# Patient Record
Sex: FEMALE | Race: WHITE | NOT HISPANIC OR LATINO | Employment: OTHER | URBAN - METROPOLITAN AREA
[De-identification: names, ages, dates, MRNs, and addresses within clinical notes are randomized per-mention and may not be internally consistent; named-entity substitution may affect disease eponyms.]

---

## 2017-11-17 ENCOUNTER — TRANSCRIBE ORDERS (OUTPATIENT)
Dept: MAMMOGRAPHY | Facility: HOSPITAL | Age: 62
End: 2017-11-17

## 2017-11-17 DIAGNOSIS — Z12.31 VISIT FOR SCREENING MAMMOGRAM: Primary | ICD-10-CM

## 2017-12-13 ENCOUNTER — APPOINTMENT (OUTPATIENT)
Dept: MAMMOGRAPHY | Facility: HOSPITAL | Age: 62
End: 2017-12-13
Attending: OBSTETRICS & GYNECOLOGY

## 2018-01-03 ENCOUNTER — TRANSCRIBE ORDERS (OUTPATIENT)
Dept: MAMMOGRAPHY | Facility: HOSPITAL | Age: 63
End: 2018-01-03

## 2018-01-03 DIAGNOSIS — Z12.31 VISIT FOR SCREENING MAMMOGRAM: Primary | ICD-10-CM

## 2018-01-24 ENCOUNTER — TRANSCRIBE ORDERS (OUTPATIENT)
Dept: MAMMOGRAPHY | Facility: HOSPITAL | Age: 63
End: 2018-01-24

## 2018-01-24 DIAGNOSIS — Z12.31 VISIT FOR SCREENING MAMMOGRAM: Primary | ICD-10-CM

## 2018-02-13 ENCOUNTER — HOSPITAL ENCOUNTER (OUTPATIENT)
Dept: MAMMOGRAPHY | Facility: HOSPITAL | Age: 63
Discharge: HOME | End: 2018-02-13
Attending: OBSTETRICS & GYNECOLOGY | Admitting: OBSTETRICS & GYNECOLOGY

## 2018-02-13 DIAGNOSIS — Z12.31 VISIT FOR SCREENING MAMMOGRAM: ICD-10-CM

## 2018-02-13 PROCEDURE — 77063 BREAST TOMOSYNTHESIS BI: CPT

## 2018-02-13 PROCEDURE — 77063 BREAST TOMOSYNTHESIS BI: CPT | Performed by: RADIOLOGY

## 2018-02-13 PROCEDURE — 77067 SCR MAMMO BI INCL CAD: CPT | Performed by: RADIOLOGY

## 2018-02-13 PROCEDURE — 77067 SCR MAMMO BI INCL CAD: CPT

## 2018-06-06 ENCOUNTER — OUTSIDE FACILITY SERVICE (OUTPATIENT)
Dept: GASTROENTEROLOGY | Facility: CLINIC | Age: 63
End: 2018-06-06

## 2018-06-06 ENCOUNTER — LAB REQUISITION (OUTPATIENT)
Dept: LAB | Facility: HOSPITAL | Age: 63
End: 2018-06-06

## 2018-06-06 DIAGNOSIS — K90.0 CELIAC DISEASE: ICD-10-CM

## 2018-06-06 DIAGNOSIS — R10.13 EPIGASTRIC PAIN: ICD-10-CM

## 2018-06-06 PROCEDURE — 43239 EGD BIOPSY SINGLE/MULTIPLE: CPT | Performed by: INTERNAL MEDICINE

## 2018-06-06 PROCEDURE — 88305 TISSUE EXAM BY PATHOLOGIST: CPT | Performed by: INTERNAL MEDICINE

## 2018-06-07 LAB
CYTO UR: NORMAL
LAB AP CASE REPORT: NORMAL
LAB AP CLINICAL INFORMATION: NORMAL
LAB AP DIAGNOSIS COMMENT: NORMAL
Lab: NORMAL
PATH REPORT.FINAL DX SPEC: NORMAL
PATH REPORT.GROSS SPEC: NORMAL

## 2018-08-23 DIAGNOSIS — Z12.11 SCREENING FOR COLON CANCER: Primary | ICD-10-CM

## 2018-08-30 ENCOUNTER — OUTSIDE FACILITY SERVICE (OUTPATIENT)
Dept: GASTROENTEROLOGY | Facility: CLINIC | Age: 63
End: 2018-08-30

## 2018-08-30 PROCEDURE — G0105 COLORECTAL SCRN; HI RISK IND: HCPCS | Performed by: INTERNAL MEDICINE

## 2019-02-05 ENCOUNTER — TRANSCRIBE ORDERS (OUTPATIENT)
Dept: ADMINISTRATIVE | Facility: HOSPITAL | Age: 64
End: 2019-02-05

## 2019-02-05 DIAGNOSIS — Z12.31 VISIT FOR SCREENING MAMMOGRAM: Primary | ICD-10-CM

## 2019-02-20 ENCOUNTER — HOSPITAL ENCOUNTER (OUTPATIENT)
Dept: MAMMOGRAPHY | Facility: HOSPITAL | Age: 64
Discharge: HOME | End: 2019-02-20
Admitting: OBSTETRICS & GYNECOLOGY

## 2019-02-20 DIAGNOSIS — Z12.31 VISIT FOR SCREENING MAMMOGRAM: ICD-10-CM

## 2019-02-20 PROCEDURE — 77063 BREAST TOMOSYNTHESIS BI: CPT

## 2019-02-20 PROCEDURE — 77067 SCR MAMMO BI INCL CAD: CPT | Performed by: RADIOLOGY

## 2019-02-20 PROCEDURE — 77063 BREAST TOMOSYNTHESIS BI: CPT | Performed by: RADIOLOGY

## 2019-02-20 PROCEDURE — 77067 SCR MAMMO BI INCL CAD: CPT

## 2020-02-06 ENCOUNTER — TRANSCRIBE ORDERS (OUTPATIENT)
Dept: MAMMOGRAPHY | Facility: HOSPITAL | Age: 65
End: 2020-02-06

## 2020-02-06 DIAGNOSIS — Z12.31 VISIT FOR SCREENING MAMMOGRAM: Primary | ICD-10-CM

## 2020-03-02 ENCOUNTER — TRANSCRIBE ORDERS (OUTPATIENT)
Dept: MAMMOGRAPHY | Facility: HOSPITAL | Age: 65
End: 2020-03-02

## 2020-03-02 DIAGNOSIS — Z12.31 VISIT FOR SCREENING MAMMOGRAM: Primary | ICD-10-CM

## 2020-03-20 ENCOUNTER — HOSPITAL ENCOUNTER (OUTPATIENT)
Dept: MAMMOGRAPHY | Facility: HOSPITAL | Age: 65
Discharge: HOME | End: 2020-03-20
Admitting: OBSTETRICS & GYNECOLOGY

## 2020-03-20 DIAGNOSIS — Z12.31 VISIT FOR SCREENING MAMMOGRAM: ICD-10-CM

## 2020-03-20 PROCEDURE — 77063 BREAST TOMOSYNTHESIS BI: CPT | Performed by: RADIOLOGY

## 2020-03-20 PROCEDURE — 77067 SCR MAMMO BI INCL CAD: CPT

## 2020-03-20 PROCEDURE — 77063 BREAST TOMOSYNTHESIS BI: CPT

## 2020-03-20 PROCEDURE — 77067 SCR MAMMO BI INCL CAD: CPT | Performed by: RADIOLOGY

## 2020-08-17 NOTE — PROGRESS NOTES
Encounter Date:08/18/2020    Location: Elaine Lundberg MD    Patient ID: Preeti Guerra is a 65 y.o. female, resident of [place], [place].    1955  Subjective:      Chief Complaint/Reason for visit:  Chest pain    Problem List:  1. Chest pain  A. Echocardiogram 5/31/16, ZURI Sethi MD revealing NL LVEF 55-60%, mild MR, mild AR, trace TR.  B. Normal Carotid Duplex 5/31/16  2. Abnormal EKG 8/18/20  3. Hyperlipidemia- diet controlled  4. GERD  5. Anxiety  6. Celiac Disease    HPI: Mrs. Guerra is a 65 yr old white female who presents in consultation today at the request of Elaine Marcos MD for further evaluation of her chest pain. Mrs. Guerra states that when she walks the track at a good pace she develops substernal chest pain that resolves if she slows her pace in just a few minutes.  It does not radiate and she has no associated symptoms.  A couple weeks ago while sitting in a chair having a pedicure she developed symptoms of neck, chest, teeth and jaw pain that lasted less than 5 minutes.  She just slowed her breathing and her symptoms resolved. She notes she had a pre-syncopal episode a year ago and underwent a brain scan which was normal.  She performs no regular exercise currently, but stays busy. She has never had an issue with her blood pressure.    Social History     Socioeconomic History   • Marital status:      Spouse name: Not on file   • Number of children: Not on file   • Years of education: Not on file   • Highest education level: Not on file   Tobacco Use   • Smoking status: Never Smoker   • Smokeless tobacco: Never Used   Substance and Sexual Activity   • Alcohol use: No   • Drug use: No       family history includes Breast cancer (age of onset: 70) in her maternal grandmother; Hypertension in her mother and sister; No Known Problems in her brother, daughter, father, maternal aunt, paternal aunt, paternal grandmother, and son.     has a past medical history of  "Menopause, Mumps, Phlebitis, and Thyroid disorder.    No Known Allergies      Current Outpatient Medications:   •  aspirin tablet, Take  by mouth daily., Disp: , Rfl:   •  Cholecalciferol (VITAMIN D) 2000 UNITS capsule, Take  by mouth daily., Disp: , Rfl:   •  ELDERBERRY PO, Take  by mouth., Disp: , Rfl:   •  estradiol (ESTRACE) 0.1 MG/GM vaginal cream, Insert 2 g into the vagina daily., Disp: 42.5 g, Rfl: 5  •  ibuprofen (ADVIL,MOTRIN) 800 MG tablet, Take  by mouth., Disp: , Rfl:   •  ketoconazole (NIZORAL) 2 % shampoo, Apply  topically., Disp: , Rfl:   •  Multiple Vitamins-Minerals (PRESERVISION AREDS 2 PO), Take  by mouth., Disp: , Rfl:   •  Omega-3 Fatty Acids (FISH OIL) 1000 MG capsule capsule, Take 1,000 mg by mouth Daily With Breakfast., Disp: , Rfl:   •  pentosan polysulfate (ELMIRON) 100 MG capsule, Take 1 capsule by mouth 2 (two) times a day., Disp: , Rfl:     Review of Systems   Constitution: Negative.   HENT: Positive for nosebleeds.    Eyes:        Glasses   Cardiovascular: Positive for chest pain and near-syncope. Negative for dyspnea on exertion, irregular heartbeat, leg swelling, orthopnea, palpitations, paroxysmal nocturnal dyspnea and syncope.   Respiratory: Negative.    Endocrine: Negative.    Hematologic/Lymphatic: Negative.    Skin: Positive for skin cancer.   Musculoskeletal: Positive for arthritis, back pain and neck pain.   Gastrointestinal:        Celiac disease   Genitourinary: Negative.    Neurological: Positive for light-headedness.   Psychiatric/Behavioral: The patient is nervous/anxious.    Allergic/Immunologic: Negative.      Vitals:    08/18/20 1059   BP: 118/64   BP Location: Left arm   Patient Position: Sitting   Pulse: 76   Temp: 97.7 °F (36.5 °C)   SpO2: 96%   Weight: 75.3 kg (166 lb)   Height: 162.6 cm (64\")     Objective:       Physical Exam   Constitutional: She is oriented to person, place, and time. She appears well-developed and well-nourished.   HENT:   Head: Atraumatic. "   Eyes: Pupils are equal, round, and reactive to light.   Neck: Normal range of motion. No thyromegaly present.   Cardiovascular: Normal rate, regular rhythm, normal heart sounds and intact distal pulses.   Pulses:       Carotid pulses are 2+ on the right side, and 2+ on the left side.       Femoral pulses are 2+ on the right side, and 2+ on the left side.       Dorsalis pedis pulses are 2+ on the right side, and 2+ on the left side.        Posterior tibial pulses are 2+ on the right side, and 2+ on the left side.   Pulmonary/Chest: Effort normal and breath sounds normal. No respiratory distress.   Abdominal: Soft. Bowel sounds are normal. There is tenderness.   Musculoskeletal: Normal range of motion. She exhibits no edema.   Neurological: She is alert and oriented to person, place, and time.   Skin: Skin is warm and dry.   Psychiatric: She has a normal mood and affect. Her behavior is normal. Judgment and thought content normal.   Vitals reviewed.    Data Review:     ECG 12 Lead  Date/Time: 8/18/2020 11:20 AM  Performed by: Fatoumata Jasso MD  Authorized by: Fatoumata Jasso MD   Comparison: compared with previous ECG from 5/9/2016  Similar to previous ECG  Rhythm: sinus rhythm  Rate: normal  BPM: 82  Conduction: conduction normal  QRS axis: normal    Clinical impression: abnormal EKG  Comments: Late Pre-cordial R wave progression             Assessment:      Diagnosis Plan   1. Other chest pain  ECG 12 Lead    Stress Test With Myocardial Perfusion (1 Day)   2. Abnormal EKG  Stress Test With Myocardial Perfusion (1 Day)   3. Dyslipidemia       Plan:   1. Exercise Cardiolite for further evaluation of chest pain and abnormal EKG  2. Follow up in 6 weeks.    Scribed for Fatoumata Jasso MD by Arabella Steele RN. 8/18/2020  11:41    I Fatoumata Jasso MD personally performed the services described in this documentation as scribed by the above individual in my presence, and it is both accurate  and complete.    Fatoumata Jasso MD, [facility]      Please note that portions of this note may have been completed with a voice recognition program. Efforts were made to edit the dictations, but occasionally words are mistranscribed.

## 2020-08-18 ENCOUNTER — CONSULT (OUTPATIENT)
Dept: CARDIOLOGY | Facility: CLINIC | Age: 65
End: 2020-08-18

## 2020-08-18 VITALS
OXYGEN SATURATION: 96 % | DIASTOLIC BLOOD PRESSURE: 64 MMHG | WEIGHT: 166 LBS | HEART RATE: 76 BPM | TEMPERATURE: 97.7 F | BODY MASS INDEX: 28.34 KG/M2 | HEIGHT: 64 IN | SYSTOLIC BLOOD PRESSURE: 118 MMHG

## 2020-08-18 DIAGNOSIS — E78.5 DYSLIPIDEMIA: ICD-10-CM

## 2020-08-18 DIAGNOSIS — R94.31 ABNORMAL EKG: ICD-10-CM

## 2020-08-18 DIAGNOSIS — R07.89 OTHER CHEST PAIN: Primary | ICD-10-CM

## 2020-08-18 PROCEDURE — 93000 ELECTROCARDIOGRAM COMPLETE: CPT | Performed by: INTERNAL MEDICINE

## 2020-08-18 PROCEDURE — 99204 OFFICE O/P NEW MOD 45 MIN: CPT | Performed by: INTERNAL MEDICINE

## 2020-08-18 RX ORDER — CHLORAL HYDRATE 500 MG
1000 CAPSULE ORAL
COMMUNITY
End: 2021-01-04

## 2020-09-21 ENCOUNTER — TRANSCRIBE ORDERS (OUTPATIENT)
Dept: ADMINISTRATIVE | Facility: HOSPITAL | Age: 65
End: 2020-09-21

## 2020-09-21 DIAGNOSIS — Z01.818 OTHER SPECIFIED PRE-OPERATIVE EXAMINATION: Primary | ICD-10-CM

## 2020-09-23 ENCOUNTER — LAB (OUTPATIENT)
Dept: LAB | Facility: HOSPITAL | Age: 65
End: 2020-09-23

## 2020-09-23 DIAGNOSIS — Z01.818 OTHER SPECIFIED PRE-OPERATIVE EXAMINATION: ICD-10-CM

## 2020-09-23 PROCEDURE — U0004 COV-19 TEST NON-CDC HGH THRU: HCPCS

## 2020-09-23 PROCEDURE — C9803 HOPD COVID-19 SPEC COLLECT: HCPCS

## 2020-09-24 LAB — SARS-COV-2 RNA NOSE QL NAA+PROBE: NOT DETECTED

## 2020-09-25 ENCOUNTER — HOSPITAL ENCOUNTER (OUTPATIENT)
Dept: CARDIOLOGY | Facility: HOSPITAL | Age: 65
Discharge: HOME | End: 2020-09-25
Admitting: INTERNAL MEDICINE

## 2020-09-25 VITALS
BODY MASS INDEX: 28 KG/M2 | RESPIRATION RATE: 18 BRPM | DIASTOLIC BLOOD PRESSURE: 78 MMHG | SYSTOLIC BLOOD PRESSURE: 120 MMHG | HEIGHT: 64 IN | HEART RATE: 68 BPM | WEIGHT: 164 LBS

## 2020-09-25 DIAGNOSIS — R07.89 OTHER CHEST PAIN: ICD-10-CM

## 2020-09-25 DIAGNOSIS — R94.31 ABNORMAL EKG: ICD-10-CM

## 2020-09-25 LAB
BH CV STRESS BP STAGE 1: NORMAL
BH CV STRESS BP STAGE 2: NORMAL
BH CV STRESS DURATION MIN STAGE 1: 3
BH CV STRESS DURATION MIN STAGE 2: 2
BH CV STRESS DURATION SEC STAGE 1: 0
BH CV STRESS DURATION SEC STAGE 2: 51
BH CV STRESS GRADE STAGE 1: 10
BH CV STRESS GRADE STAGE 2: 12
BH CV STRESS HR STAGE 1: 131
BH CV STRESS HR STAGE 2: 160
BH CV STRESS METS STAGE 1: 5
BH CV STRESS METS STAGE 2: 7.5
BH CV STRESS O2 STAGE 1: 100
BH CV STRESS O2 STAGE 2: 100
BH CV STRESS PROTOCOL 1: NORMAL
BH CV STRESS RECOVERY BP: NORMAL MMHG
BH CV STRESS RECOVERY HR: 85 BPM
BH CV STRESS SPEED STAGE 1: 1.7
BH CV STRESS SPEED STAGE 2: 2.5
BH CV STRESS STAGE 1: 1
BH CV STRESS STAGE 2: 2
LV EF NUC BP: 68 %
MAXIMAL PREDICTED HEART RATE: 154 BPM
PERCENT MAX PREDICTED HR: 103.9 %
STRESS BASELINE BP: NORMAL MMHG
STRESS BASELINE HR: 65 BPM
STRESS O2 SAT REST: 100 %
STRESS PERCENT HR: 122 %
STRESS POST ESTIMATED WORKLOAD: 7 METS
STRESS POST EXERCISE DUR MIN: 5 MIN
STRESS POST EXERCISE DUR SEC: 21 SEC
STRESS POST O2 SAT PEAK: 98 %
STRESS POST PEAK BP: NORMAL MMHG
STRESS POST PEAK HR: 160 BPM
STRESS TARGET HR: 131 BPM

## 2020-09-25 PROCEDURE — 93017 CV STRESS TEST TRACING ONLY: CPT

## 2020-09-25 PROCEDURE — 93018 CV STRESS TEST I&R ONLY: CPT | Performed by: INTERNAL MEDICINE

## 2020-09-25 PROCEDURE — 78452 HT MUSCLE IMAGE SPECT MULT: CPT | Performed by: INTERNAL MEDICINE

## 2020-09-25 PROCEDURE — A9500 TC99M SESTAMIBI: HCPCS | Performed by: INTERNAL MEDICINE

## 2020-09-25 PROCEDURE — 0 TECHNETIUM SESTAMIBI: Performed by: INTERNAL MEDICINE

## 2020-09-25 PROCEDURE — 78452 HT MUSCLE IMAGE SPECT MULT: CPT

## 2020-09-25 RX ADMIN — TECHNETIUM TC 99M SESTAMIBI 1 DOSE: 1 INJECTION INTRAVENOUS at 11:15

## 2020-10-20 ENCOUNTER — OFFICE VISIT (OUTPATIENT)
Dept: CARDIOLOGY | Facility: CLINIC | Age: 65
End: 2020-10-20

## 2020-10-20 VITALS
HEART RATE: 63 BPM | HEIGHT: 63 IN | TEMPERATURE: 96.8 F | BODY MASS INDEX: 28 KG/M2 | SYSTOLIC BLOOD PRESSURE: 112 MMHG | DIASTOLIC BLOOD PRESSURE: 70 MMHG | WEIGHT: 158 LBS | OXYGEN SATURATION: 98 %

## 2020-10-20 DIAGNOSIS — R94.31 ABNORMAL EKG: ICD-10-CM

## 2020-10-20 DIAGNOSIS — R07.89 OTHER CHEST PAIN: Primary | ICD-10-CM

## 2020-10-20 DIAGNOSIS — R94.39 ABNORMAL NUCLEAR STRESS TEST: ICD-10-CM

## 2020-10-20 PROCEDURE — 99213 OFFICE O/P EST LOW 20 MIN: CPT | Performed by: NURSE PRACTITIONER

## 2020-10-20 RX ORDER — LEVOTHYROXINE SODIUM 0.03 MG/1
25 TABLET ORAL DAILY
COMMUNITY
End: 2022-05-05

## 2020-10-20 NOTE — PROGRESS NOTES
[facility] Cardiology    Patient ID: Preeti Guerra is a 65 y.o. female.  : 1955   Contact: [phone]    Encounter date: 10/20/2020    PCP: Elaine Marcos MD      Chief complaint:   Chief Complaint   Patient presents with   • Palpitations     PROBLEM LIST:  1. Chest pain   A. Echocardiogram 16, ZURI Sethi MD revealing NL LVEF 55-60%, mild MR, mild AR, trace TR.   B. Normal Carotid Duplex 16   C. Stress cardiolite 2020: mildly reduced exercise tolerance, stopped exercise due to fatigue, SOA. area of mild fixed  apical hypoperfusion was seen which was actually worse in the resting images and felt to be a normal variant. The 3D  reconstruction showed normal contractility in all segments. The calculated ejection fraction of 68%. No left ventricular  dilation was seen with stress.  2. Abnormal EKG 20.  3. Hyperlipidemia- diet controlled  4. GERD  5. Anxiety  6. Celiac Disease    No Known Allergies    Current Medications:    Current Outpatient Medications:   •  aspirin tablet, Take 81 mg by mouth Daily., Disp: , Rfl:   •  Cholecalciferol (VITAMIN D) 2000 UNITS capsule, Take 2,000 Units by mouth Daily., Disp: , Rfl:   •  ELDERBERRY PO, Take  by mouth Daily., Disp: , Rfl:   •  estradiol (ESTRACE) 0.1 MG/GM vaginal cream, Insert 2 g into the vagina daily., Disp: 42.5 g, Rfl: 5  •  ibuprofen (ADVIL,MOTRIN) 800 MG tablet, Take 800 mg by mouth Every 6 (Six) Hours As Needed., Disp: , Rfl:   •  ketoconazole (NIZORAL) 2 % shampoo, Apply  topically., Disp: , Rfl:   •  levothyroxine (SYNTHROID, LEVOTHROID) 25 MCG tablet, Take 25 mcg by mouth Daily., Disp: , Rfl:   •  Multiple Vitamins-Minerals (PRESERVISION AREDS 2 PO), Take 1 tablet by mouth Daily., Disp: , Rfl:   •  Omega-3 Fatty Acids (FISH OIL) 1000 MG capsule capsule, Take 1,000 mg by mouth Daily With Breakfast., Disp: , Rfl:   •  pentosan polysulfate (ELMIRON) 100 MG capsule, Take 1 capsule by mouth 2 (two)  "times a day., Disp: , Rfl:     HPI    Preeti Guerra is a 65 y.o. female who presents today for follow up on chest pain, FINNEY. Since last visit, she has only had one episode of chest pressure with exertion. She continues to have FINNEY. Denies STEF, PND, orthopnea. BP controlled. PCP follows lipids which have been normal per her account. She had a exercise cardiolite stress which was indeterminate with a perfusion defect worse at rest than with stress.       The following portions of the patient's history were reviewed and updated as appropriate: allergies, current medications and problem list.    Pertinent positives as listed in the HPI.  All other systems reviewed are negative.       Vitals:    10/20/20 1302   BP: 112/70   BP Location: Left arm   Patient Position: Sitting   Pulse: 63   Temp: 96.8 °F (36 °C)   SpO2: 98%   Weight: 71.7 kg (158 lb)   Height: 160 cm (63\")       Physical Exam:  General: Alert and oriented.  Neck: Jugular venous pressure is within normal limits. Carotids have normal upstrokes without bruits.   Cardiovascular: Heart has a nondisplaced focal PMI. Regular rate and rhythm without murmur, gallop or rub.  Lungs: Clear without rales or wheezes. Equal expansion is noted.   Extremities: Show no edema.  Skin: Warm and dry.  Neurologic: Nonfocal.     Diagnostic Data:  Lab Results   Component Value Date    GLUCOSE 90 05/09/2016    BUN 17 05/09/2016    CREATININE 0.9 05/09/2016    K 4.6 05/09/2016    CO2 29 05/09/2016    CALCIUM 10.0 05/09/2016    ALBUMIN 4.4 05/09/2016    AST 20 05/09/2016    ALT 18 05/09/2016     Lab Results   Component Value Date    GLUCOSE 90 05/09/2016    CALCIUM 10.0 05/09/2016     05/09/2016    K 4.6 05/09/2016    CO2 29 05/09/2016     05/09/2016    BUN 17 05/09/2016    CREATININE 0.9 05/09/2016    ANIONGAP 8 05/09/2016     No results found for: CHOL, CHLPL, TRIG, HDL, LDL, LDLDIRECT  Lab Results   Component Value Date    WBC 4.32 05/09/2016    HGB 14.8 " 05/09/2016    HCT 43.6 05/09/2016    MCV 89.7 05/09/2016     05/09/2016     Lab Results   Component Value Date    TSH 3.141 05/09/2016       Procedures      ASSESSMENT:    ICD-10-CM ICD-9-CM   1. Other chest pain  R07.89 786.59   2. Abnormal EKG  R94.31 794.31   3. Abnormal nuclear stress test  R94.39 794.39     Lab results found above were reviewed with the patient.      PLAN:  1. Discussed stress test findings with patient. Since this is an indeterminate stress and she continues to have complaints of exertional chest pressure and FINNEY, a LHC plus/minus CBI is reasonable. She wants to think about this and give us a call back.   2. Continue ASA  3. Should she have worsening chest pain, she was directed to go to ED for further evaluation.   4. Continue all other current medications.  5. F/up in 6 months, sooner if needed.      Electronically signed by LAUREN Marion, 10/20/20, 1:24 PM EDT.

## 2020-10-21 ENCOUNTER — TELEPHONE (OUTPATIENT)
Dept: CARDIOLOGY | Facility: CLINIC | Age: 65
End: 2020-10-21

## 2020-10-21 DIAGNOSIS — R94.39 ABNORMAL NUCLEAR STRESS TEST: Primary | ICD-10-CM

## 2020-10-22 ENCOUNTER — TELEPHONE (OUTPATIENT)
Dept: CARDIOLOGY | Facility: CLINIC | Age: 65
End: 2020-10-22

## 2020-10-22 NOTE — TELEPHONE ENCOUNTER
No answer and no vm to leave a msg- I tried to call her back to let her know that scheduling will call her to schedule LHC-

## 2020-12-18 ENCOUNTER — APPOINTMENT (OUTPATIENT)
Dept: CARDIOLOGY | Facility: HOSPITAL | Age: 65
End: 2020-12-18

## 2020-12-18 ENCOUNTER — HOSPITAL ENCOUNTER (OUTPATIENT)
Facility: HOSPITAL | Age: 65
Discharge: HOME | End: 2020-12-18
Attending: INTERNAL MEDICINE | Admitting: INTERNAL MEDICINE

## 2020-12-18 VITALS
DIASTOLIC BLOOD PRESSURE: 64 MMHG | HEIGHT: 63 IN | OXYGEN SATURATION: 97 % | RESPIRATION RATE: 18 BRPM | WEIGHT: 160 LBS | SYSTOLIC BLOOD PRESSURE: 110 MMHG | TEMPERATURE: 98.1 F | HEART RATE: 67 BPM | BODY MASS INDEX: 28.35 KG/M2

## 2020-12-18 DIAGNOSIS — I25.118 CORONARY ARTERY DISEASE OF NATIVE ARTERY OF NATIVE HEART WITH STABLE ANGINA PECTORIS (HCC): Primary | ICD-10-CM

## 2020-12-18 DIAGNOSIS — R94.39 ABNORMAL NUCLEAR STRESS TEST: ICD-10-CM

## 2020-12-18 DIAGNOSIS — I25.119 CORONARY ARTERY DISEASE INVOLVING NATIVE CORONARY ARTERY OF NATIVE HEART WITH ANGINA PECTORIS (HCC): Primary | ICD-10-CM

## 2020-12-18 LAB
ACT BLD: 301 SECONDS (ref 82–152)
ANION GAP SERPL CALCULATED.3IONS-SCNC: 8 MMOL/L (ref 5–15)
BH CV ECHO MEAS - AI DEC SLOPE: 171 CM/SEC^2
BH CV ECHO MEAS - AI MAX PG: 40.5 MMHG
BH CV ECHO MEAS - AI MAX VEL: 308 CM/SEC
BH CV ECHO MEAS - AI P1/2T: 527.6 MSEC
BH CV ECHO MEAS - AO MAX PG (FULL): 1.1 MMHG
BH CV ECHO MEAS - AO MAX PG: 4.2 MMHG
BH CV ECHO MEAS - AO MEAN PG (FULL): 1 MMHG
BH CV ECHO MEAS - AO MEAN PG: 3 MMHG
BH CV ECHO MEAS - AO ROOT AREA (BSA CORRECTED): 1.9
BH CV ECHO MEAS - AO ROOT AREA: 9.1 CM^2
BH CV ECHO MEAS - AO ROOT DIAM: 3.4 CM
BH CV ECHO MEAS - AO V2 MAX: 103 CM/SEC
BH CV ECHO MEAS - AO V2 MEAN: 75.2 CM/SEC
BH CV ECHO MEAS - AO V2 VTI: 24.3 CM
BH CV ECHO MEAS - ASC AORTA: 3 CM
BH CV ECHO MEAS - AVA(I,A): 2.6 CM^2
BH CV ECHO MEAS - AVA(I,D): 2.6 CM^2
BH CV ECHO MEAS - AVA(V,A): 2.7 CM^2
BH CV ECHO MEAS - AVA(V,D): 2.7 CM^2
BH CV ECHO MEAS - BSA(HAYCOCK): 1.8 M^2
BH CV ECHO MEAS - BSA(HAYCOCK): 1.8 M^2
BH CV ECHO MEAS - BSA: 1.8 M^2
BH CV ECHO MEAS - BSA: 1.8 M^2
BH CV ECHO MEAS - BZI_BMI: 28.3 KILOGRAMS/M^2
BH CV ECHO MEAS - BZI_BMI: 28.3 KILOGRAMS/M^2
BH CV ECHO MEAS - BZI_METRIC_HEIGHT: 160 CM
BH CV ECHO MEAS - BZI_METRIC_HEIGHT: 160 CM
BH CV ECHO MEAS - BZI_METRIC_WEIGHT: 72.6 KG
BH CV ECHO MEAS - BZI_METRIC_WEIGHT: 72.6 KG
BH CV ECHO MEAS - EDV(CUBED): 108.5 ML
BH CV ECHO MEAS - EDV(MOD-SP2): 69 ML
BH CV ECHO MEAS - EDV(MOD-SP4): 73 ML
BH CV ECHO MEAS - EDV(TEICH): 106 ML
BH CV ECHO MEAS - EF(CUBED): 76.1 %
BH CV ECHO MEAS - EF(MOD-BP): 62 %
BH CV ECHO MEAS - EF(MOD-SP2): 63.8 %
BH CV ECHO MEAS - EF(MOD-SP4): 60.3 %
BH CV ECHO MEAS - EF(TEICH): 68 %
BH CV ECHO MEAS - ESV(CUBED): 25.9 ML
BH CV ECHO MEAS - ESV(MOD-SP2): 25 ML
BH CV ECHO MEAS - ESV(MOD-SP4): 29 ML
BH CV ECHO MEAS - ESV(TEICH): 33.9 ML
BH CV ECHO MEAS - FS: 37.9 %
BH CV ECHO MEAS - IVS/LVPW: 0.9
BH CV ECHO MEAS - IVSD: 0.88 CM
BH CV ECHO MEAS - LA DIMENSION: 2.9 CM
BH CV ECHO MEAS - LA/AO: 0.85
BH CV ECHO MEAS - LAD MAJOR: 3.7 CM
BH CV ECHO MEAS - LAT PEAK E' VEL: 9.8 CM/SEC
BH CV ECHO MEAS - LATERAL E/E' RATIO: 6.9
BH CV ECHO MEAS - LV DIASTOLIC VOL/BSA (35-75): 41.5 ML/M^2
BH CV ECHO MEAS - LV IVRT: 0.11 SEC
BH CV ECHO MEAS - LV MASS(C)D: 152.4 GRAMS
BH CV ECHO MEAS - LV MASS(C)DI: 86.7 GRAMS/M^2
BH CV ECHO MEAS - LV MAX PG: 3.2 MMHG
BH CV ECHO MEAS - LV MEAN PG: 2 MMHG
BH CV ECHO MEAS - LV SYSTOLIC VOL/BSA (12-30): 16.5 ML/M^2
BH CV ECHO MEAS - LV V1 MAX: 89.3 CM/SEC
BH CV ECHO MEAS - LV V1 MEAN: 54.9 CM/SEC
BH CV ECHO MEAS - LV V1 VTI: 20.2 CM
BH CV ECHO MEAS - LVIDD: 4.8 CM
BH CV ECHO MEAS - LVIDS: 3 CM
BH CV ECHO MEAS - LVLD AP2: 7.2 CM
BH CV ECHO MEAS - LVLD AP4: 7.1 CM
BH CV ECHO MEAS - LVLS AP2: 6 CM
BH CV ECHO MEAS - LVLS AP4: 5.8 CM
BH CV ECHO MEAS - LVOT AREA (M): 3.1 CM^2
BH CV ECHO MEAS - LVOT AREA: 3.1 CM^2
BH CV ECHO MEAS - LVOT DIAM: 2 CM
BH CV ECHO MEAS - LVPWD: 0.98 CM
BH CV ECHO MEAS - MED PEAK E' VEL: 6.8 CM/SEC
BH CV ECHO MEAS - MEDIAL E/E' RATIO: 10.1
BH CV ECHO MEAS - MV A MAX VEL: 71.3 CM/SEC
BH CV ECHO MEAS - MV DEC SLOPE: 228 CM/SEC^2
BH CV ECHO MEAS - MV DEC TIME: 0.17 SEC
BH CV ECHO MEAS - MV E MAX VEL: 68.3 CM/SEC
BH CV ECHO MEAS - MV E/A: 0.96
BH CV ECHO MEAS - MV P1/2T MAX VEL: 88 CM/SEC
BH CV ECHO MEAS - MV P1/2T: 113 MSEC
BH CV ECHO MEAS - MVA P1/2T LCG: 2.5 CM^2
BH CV ECHO MEAS - MVA(P1/2T): 1.9 CM^2
BH CV ECHO MEAS - PA ACC SLOPE: 307 CM/SEC^2
BH CV ECHO MEAS - PA ACC TIME: 0.17 SEC
BH CV ECHO MEAS - PA MAX PG: 1.4 MMHG
BH CV ECHO MEAS - PA PR(ACCEL): 4.8 MMHG
BH CV ECHO MEAS - PA V2 MAX: 58.2 CM/SEC
BH CV ECHO MEAS - SI(AO): 125.4 ML/M^2
BH CV ECHO MEAS - SI(CUBED): 47 ML/M^2
BH CV ECHO MEAS - SI(LVOT): 36.1 ML/M^2
BH CV ECHO MEAS - SI(MOD-SP2): 25 ML/M^2
BH CV ECHO MEAS - SI(MOD-SP4): 25 ML/M^2
BH CV ECHO MEAS - SI(TEICH): 41 ML/M^2
BH CV ECHO MEAS - SV(AO): 220.6 ML
BH CV ECHO MEAS - SV(CUBED): 82.6 ML
BH CV ECHO MEAS - SV(LVOT): 63.5 ML
BH CV ECHO MEAS - SV(MOD-SP2): 44 ML
BH CV ECHO MEAS - SV(MOD-SP4): 44 ML
BH CV ECHO MEAS - SV(TEICH): 72.1 ML
BH CV ECHO MEAS - TAPSE (>1.6): 2 CM
BH CV ECHO MEASUREMENTS AVERAGE E/E' RATIO: 8.23
BH CV VAS BP RIGHT ARM: NORMAL MMHG
BH CV XLRA - RV BASE: 2.9 CM
BH CV XLRA - RV LENGTH: 5.8 CM
BH CV XLRA - RV MID: 2.4 CM
BH CV XLRA - TDI S': 10.1 CM/SEC
BH CV XLRA MEAS LEFT CCA RATIO VEL: 85.2 CM/SEC
BH CV XLRA MEAS LEFT DIST CCA EDV: 25.1 CM/SEC
BH CV XLRA MEAS LEFT DIST CCA PSV: 85.2 CM/SEC
BH CV XLRA MEAS LEFT DIST ICA EDV: 22.3 CM/SEC
BH CV XLRA MEAS LEFT DIST ICA PSV: 59.8 CM/SEC
BH CV XLRA MEAS LEFT ICA RATIO VEL: 84.5 CM/SEC
BH CV XLRA MEAS LEFT ICA/CCA RATIO: 0.99
BH CV XLRA MEAS LEFT MID CCA EDV: 25.8 CM/SEC
BH CV XLRA MEAS LEFT MID CCA PSV: 90.8 CM/SEC
BH CV XLRA MEAS LEFT MID ICA EDV: 27.7 CM/SEC
BH CV XLRA MEAS LEFT MID ICA PSV: 80.9 CM/SEC
BH CV XLRA MEAS LEFT PROX CCA EDV: 21 CM/SEC
BH CV XLRA MEAS LEFT PROX CCA PSV: 83.1 CM/SEC
BH CV XLRA MEAS LEFT PROX ECA PSV: 76.8 CM/SEC
BH CV XLRA MEAS LEFT PROX ICA EDV: 23.7 CM/SEC
BH CV XLRA MEAS LEFT PROX ICA PSV: 84.5 CM/SEC
BH CV XLRA MEAS LEFT PROX SCLA PSV: 129 CM/SEC
BH CV XLRA MEAS LEFT VERTEBRAL A EDV: 16.8 CM/SEC
BH CV XLRA MEAS LEFT VERTEBRAL A PSV: 59.4 CM/SEC
BH CV XLRA MEAS RIGHT CCA RATIO VEL: 93 CM/SEC
BH CV XLRA MEAS RIGHT DIST CCA EDV: 26.4 CM/SEC
BH CV XLRA MEAS RIGHT DIST CCA PSV: 93 CM/SEC
BH CV XLRA MEAS RIGHT DIST ICA EDV: 18.3 CM/SEC
BH CV XLRA MEAS RIGHT DIST ICA PSV: 65 CM/SEC
BH CV XLRA MEAS RIGHT ICA RATIO VEL: 90.5 CM/SEC
BH CV XLRA MEAS RIGHT ICA/CCA RATIO: 0.97
BH CV XLRA MEAS RIGHT MID CCA EDV: 24.5 CM/SEC
BH CV XLRA MEAS RIGHT MID CCA PSV: 106 CM/SEC
BH CV XLRA MEAS RIGHT MID ICA EDV: 23.2 CM/SEC
BH CV XLRA MEAS RIGHT MID ICA PSV: 64.8 CM/SEC
BH CV XLRA MEAS RIGHT PROX CCA EDV: 12.7 CM/SEC
BH CV XLRA MEAS RIGHT PROX CCA PSV: 73.7 CM/SEC
BH CV XLRA MEAS RIGHT PROX ECA PSV: 77.9 CM/SEC
BH CV XLRA MEAS RIGHT PROX ICA EDV: 27.7 CM/SEC
BH CV XLRA MEAS RIGHT PROX ICA PSV: 90.5 CM/SEC
BH CV XLRA MEAS RIGHT PROX SCLA PSV: 113 CM/SEC
BH CV XLRA MEAS RIGHT VERTEBRAL A EDV: 20.7 CM/SEC
BH CV XLRA MEAS RIGHT VERTEBRAL A PSV: 77.9 CM/SEC
BUN SERPL-MCNC: 10 MG/DL (ref 8–23)
BUN/CREAT SERPL: 11.5 (ref 7–25)
CALCIUM SPEC-SCNC: 8.7 MG/DL (ref 8.6–10.5)
CHLORIDE SERPL-SCNC: 109 MMOL/L (ref 98–107)
CHOLEST SERPL-MCNC: 227 MG/DL (ref 0–200)
CO2 SERPL-SCNC: 26 MMOL/L (ref 22–29)
CREAT SERPL-MCNC: 0.87 MG/DL (ref 0.57–1)
DEPRECATED RDW RBC AUTO: 47.8 FL (ref 37–54)
ERYTHROCYTE [DISTWIDTH] IN BLOOD BY AUTOMATED COUNT: 14.1 % (ref 12.3–15.4)
GFR SERPL CREATININE-BSD FRML MDRD: 65 ML/MIN/1.73
GLUCOSE SERPL-MCNC: 95 MG/DL (ref 65–99)
HCT VFR BLD AUTO: 42.7 % (ref 34–46.6)
HDLC SERPL-MCNC: 48 MG/DL (ref 40–60)
HGB BLD-MCNC: 13.8 G/DL (ref 12–15.9)
LDLC SERPL CALC-MCNC: 151 MG/DL (ref 0–100)
LDLC/HDLC SERPL: 3.08 {RATIO}
LEFT ATRIUM VOLUME INDEX: 14.2 ML/M^2
LEFT ATRIUM VOLUME: 25 ML
LV EF 2D ECHO EST: 60 %
MCH RBC QN AUTO: 29.9 PG (ref 26.6–33)
MCHC RBC AUTO-ENTMCNC: 32.3 G/DL (ref 31.5–35.7)
MCV RBC AUTO: 92.4 FL (ref 79–97)
PLATELET # BLD AUTO: 192 10*3/MM3 (ref 140–450)
PMV BLD AUTO: 9.9 FL (ref 6–12)
POTASSIUM SERPL-SCNC: 3.7 MMOL/L (ref 3.5–5.2)
RBC # BLD AUTO: 4.62 10*6/MM3 (ref 3.77–5.28)
SODIUM SERPL-SCNC: 143 MMOL/L (ref 136–145)
TRIGL SERPL-MCNC: 156 MG/DL (ref 0–150)
VLDLC SERPL-MCNC: 28 MG/DL (ref 5–40)
WBC # BLD AUTO: 4.15 10*3/MM3 (ref 3.4–10.8)

## 2020-12-18 PROCEDURE — 92978 ENDOLUMINL IVUS OCT C 1ST: CPT | Performed by: INTERNAL MEDICINE

## 2020-12-18 PROCEDURE — 93571 IV DOP VEL&/PRESS C FLO 1ST: CPT | Performed by: INTERNAL MEDICINE

## 2020-12-18 PROCEDURE — 99152 MOD SED SAME PHYS/QHP 5/>YRS: CPT | Performed by: INTERNAL MEDICINE

## 2020-12-18 PROCEDURE — 25010000002 FENTANYL CITRATE (PF) 100 MCG/2ML SOLUTION: Performed by: INTERNAL MEDICINE

## 2020-12-18 PROCEDURE — 85027 COMPLETE CBC AUTOMATED: CPT | Performed by: INTERNAL MEDICINE

## 2020-12-18 PROCEDURE — 93306 TTE W/DOPPLER COMPLETE: CPT

## 2020-12-18 PROCEDURE — 25010000002 MIDAZOLAM PER 1 MG: Performed by: INTERNAL MEDICINE

## 2020-12-18 PROCEDURE — 99205 OFFICE O/P NEW HI 60 MIN: CPT | Performed by: STUDENT IN AN ORGANIZED HEALTH CARE EDUCATION/TRAINING PROGRAM

## 2020-12-18 PROCEDURE — 93880 EXTRACRANIAL BILAT STUDY: CPT

## 2020-12-18 PROCEDURE — C1769 GUIDE WIRE: HCPCS | Performed by: INTERNAL MEDICINE

## 2020-12-18 PROCEDURE — C1894 INTRO/SHEATH, NON-LASER: HCPCS | Performed by: INTERNAL MEDICINE

## 2020-12-18 PROCEDURE — C1887 CATHETER, GUIDING: HCPCS | Performed by: INTERNAL MEDICINE

## 2020-12-18 PROCEDURE — 93572 IV DOP VEL&/PRESS C FLO EA: CPT | Performed by: INTERNAL MEDICINE

## 2020-12-18 PROCEDURE — 93880 EXTRACRANIAL BILAT STUDY: CPT | Performed by: INTERNAL MEDICINE

## 2020-12-18 PROCEDURE — 85347 COAGULATION TIME ACTIVATED: CPT

## 2020-12-18 PROCEDURE — 0 IOPAMIDOL PER 1 ML: Performed by: INTERNAL MEDICINE

## 2020-12-18 PROCEDURE — 93458 L HRT ARTERY/VENTRICLE ANGIO: CPT | Performed by: INTERNAL MEDICINE

## 2020-12-18 PROCEDURE — 93306 TTE W/DOPPLER COMPLETE: CPT | Performed by: INTERNAL MEDICINE

## 2020-12-18 PROCEDURE — 80048 BASIC METABOLIC PNL TOTAL CA: CPT | Performed by: INTERNAL MEDICINE

## 2020-12-18 PROCEDURE — C1753 CATH, INTRAVAS ULTRASOUND: HCPCS | Performed by: INTERNAL MEDICINE

## 2020-12-18 PROCEDURE — 99153 MOD SED SAME PHYS/QHP EA: CPT | Performed by: INTERNAL MEDICINE

## 2020-12-18 PROCEDURE — 25010000002 HEPARIN (PORCINE) PER 1000 UNITS: Performed by: INTERNAL MEDICINE

## 2020-12-18 PROCEDURE — 80061 LIPID PANEL: CPT | Performed by: INTERNAL MEDICINE

## 2020-12-18 RX ORDER — HYDROCODONE BITARTRATE AND ACETAMINOPHEN 7.5; 325 MG/1; MG/1
1 TABLET ORAL EVERY 4 HOURS PRN
Status: DISCONTINUED | OUTPATIENT
Start: 2020-12-18 | End: 2020-12-18 | Stop reason: HOSPADM

## 2020-12-18 RX ORDER — HEPARIN SODIUM 1000 [USP'U]/ML
INJECTION, SOLUTION INTRAVENOUS; SUBCUTANEOUS AS NEEDED
Status: DISCONTINUED | OUTPATIENT
Start: 2020-12-18 | End: 2020-12-18 | Stop reason: HOSPADM

## 2020-12-18 RX ORDER — LIDOCAINE HYDROCHLORIDE 10 MG/ML
INJECTION, SOLUTION EPIDURAL; INFILTRATION; INTRACAUDAL; PERINEURAL AS NEEDED
Status: DISCONTINUED | OUTPATIENT
Start: 2020-12-18 | End: 2020-12-18 | Stop reason: HOSPADM

## 2020-12-18 RX ORDER — ALPRAZOLAM 0.25 MG/1
0.25 TABLET ORAL 3 TIMES DAILY PRN
Status: DISCONTINUED | OUTPATIENT
Start: 2020-12-18 | End: 2020-12-18 | Stop reason: HOSPADM

## 2020-12-18 RX ORDER — NALOXONE HCL 0.4 MG/ML
0.4 VIAL (ML) INJECTION
Status: DISCONTINUED | OUTPATIENT
Start: 2020-12-18 | End: 2020-12-18 | Stop reason: HOSPADM

## 2020-12-18 RX ORDER — ROSUVASTATIN CALCIUM 10 MG/1
10 TABLET, COATED ORAL DAILY
Qty: 90 TABLET | Refills: 3 | Status: SHIPPED | OUTPATIENT
Start: 2020-12-18 | End: 2021-08-17 | Stop reason: DRUGHIGH

## 2020-12-18 RX ORDER — MIDAZOLAM HYDROCHLORIDE 1 MG/ML
INJECTION INTRAMUSCULAR; INTRAVENOUS AS NEEDED
Status: DISCONTINUED | OUTPATIENT
Start: 2020-12-18 | End: 2020-12-18 | Stop reason: HOSPADM

## 2020-12-18 RX ORDER — MORPHINE SULFATE 2 MG/ML
1 INJECTION, SOLUTION INTRAMUSCULAR; INTRAVENOUS EVERY 4 HOURS PRN
Status: DISCONTINUED | OUTPATIENT
Start: 2020-12-18 | End: 2020-12-18 | Stop reason: HOSPADM

## 2020-12-18 RX ORDER — ACETAMINOPHEN 325 MG/1
650 TABLET ORAL EVERY 4 HOURS PRN
Status: DISCONTINUED | OUTPATIENT
Start: 2020-12-18 | End: 2020-12-18 | Stop reason: HOSPADM

## 2020-12-18 RX ORDER — FENTANYL CITRATE 50 UG/ML
INJECTION, SOLUTION INTRAMUSCULAR; INTRAVENOUS AS NEEDED
Status: DISCONTINUED | OUTPATIENT
Start: 2020-12-18 | End: 2020-12-18 | Stop reason: HOSPADM

## 2020-12-18 RX ORDER — SODIUM CHLORIDE 9 MG/ML
100 INJECTION, SOLUTION INTRAVENOUS CONTINUOUS
Status: ACTIVE | OUTPATIENT
Start: 2020-12-18 | End: 2020-12-18

## 2020-12-18 RX ADMIN — ACETAMINOPHEN 650 MG: 325 TABLET, FILM COATED ORAL at 10:22

## 2020-12-18 NOTE — CONSULTS
CTS Consult  Preeti Parrahear  [id number]  1955    Patient Care Team:  Elaine Marcos MD as PCP - General  Elaine Marcos MD as PCP - Family Medicine    CC: Left main coronary artery disease      Reason for Consult: 60% left main, IFR positive    HPI: Delightful 65-year-old female who was undergoing a pedicure and developed chest pain that radiated into her jaw.  This lasted about 4 minutes and went away on its own.  She has noticed some decreased stamina and dyspnea on exertion.  This prompted her to seek evaluation by her cardiologist.  She was seen by Dr. Jasso in September with a stress test which was positive.  She underwent left heart catheterization today, December 18, and was found to have a 60% left main with a preserved ejection fraction.  She is otherwise recovering well from the heart catheterization and has no current chest pain.  She did have a history of syncope in 2016 where she was worked up with an echo and carotid duplexes.  She has not had syncope since then.  She does have a history of Crohn's disease as well as hemorrhagic cystitis.      Abnormal nuclear stress test      Review of Systems:  General: No anorexia, no weight loss, general malaise and weakness.  No fever chills or night sweats.  HEENT: No headaches no visual changes no hearing loss no rhinitis no pharyngitis  Pulmonary: No shortness of breath, no cough, no hemoptysis  Heart: No palpitations,  No malaise or shortness of breath, no atrial fibrillation, no bradycardia, no syncope. +anginal quality chest pain.  Gastrointestinal: No nausea, vomiting, diarrhea, or constipation. No acholic stool, no jaundice.  Renal: No dysuria, no frequency, no hematuria.  Skin: No rash, no skin lesions, no skin tumors.  Neurologic: No seizures, no muscle weakness, no sensory deficit, no amaurosis,  Psychiatric: No anxiety, no history of psychosis  Hematologic: No bleeding history, no ease of bruising, no history of blood  disorder.  All other systems were reviewed and are negative.    History  Past Medical History:   Diagnosis Date   • Celiac disease    • Menopause    • Mumps    • Phlebitis    • Thyroid disorder      Past Surgical History:   Procedure Laterality Date   • AUGMENTATION MAMMAPLASTY Bilateral     PT had implants removed   • BREAST SURGERY      implants removed   • SKIN CANCER EXCISION       Family History   Problem Relation Age of Onset   • Hypertension Mother    • Hypertension Sister    • Breast cancer Maternal Grandmother 70   • No Known Problems Father    • No Known Problems Brother    • No Known Problems Daughter    • No Known Problems Son    • No Known Problems Paternal Grandmother    • No Known Problems Maternal Aunt    • No Known Problems Paternal Aunt    • Lung cancer Neg Hx    • Colon cancer Neg Hx    • BRCA 1/2 Neg Hx    • Endometrial cancer Neg Hx    • Ovarian cancer Neg Hx      Social History     Tobacco Use   • Smoking status: Never Smoker   • Smokeless tobacco: Never Used   Substance Use Topics   • Alcohol use: No   • Drug use: No     Medications Prior to Admission   Medication Sig Dispense Refill Last Dose   • aspirin tablet Take 81 mg by mouth Daily.   12/18/2020 at Unknown time   • Cholecalciferol (VITAMIN D) 2000 UNITS capsule Take 2,000 Units by mouth Daily.   Past Week at Unknown time   • ELDERBERRY PO Take  by mouth Daily.   12/17/2020 at Unknown time   • estradiol (ESTRACE) 0.1 MG/GM vaginal cream Insert 2 g into the vagina daily. (Patient taking differently: Insert 2 g into the vagina 1 (One) Time Per Week.) 42.5 g 5 Past Week at Unknown time   • ibuprofen (ADVIL,MOTRIN) 800 MG tablet Take 800 mg by mouth Every 6 (Six) Hours As Needed.      • ketoconazole (NIZORAL) 2 % shampoo Apply  topically.      • levothyroxine (SYNTHROID, LEVOTHROID) 25 MCG tablet Take 25 mcg by mouth Daily.   12/18/2020 at Unknown time   • Omega-3 Fatty Acids (FISH OIL) 1000 MG capsule capsule Take 1,000 mg by mouth Daily  With Breakfast.   Past Month at Unknown time   • pentosan polysulfate (ELMIRON) 100 MG capsule Take 1 capsule by mouth 2 (two) times a day.   12/18/2020 at Unknown time     Allergies:  Patient has no known allergies.    Objective    Vital Signs  Temp:  [98.1 °F (36.7 °C)] 98.1 °F (36.7 °C)  Heart Rate:  [66-89] 70  Resp:  [16-18] 18  BP: ()/(60-82) 110/63    Physical Exam:  General Appearance: alert, appears stated age and cooperative  Head: normocephalic, without obvious abnormality and atraumatic  Ears/Nose: no rhinitis, external ears normal  Throat:  no oral lesions, no pharyngitis  Neck: no adenopathy, suppple, trachea midline, no thyromegaly, no carotid bruit and no JVD  Lungs: clear to auscultation, respirations regular, respirations even and respirations unlabored  Heart: regular rhythm & normal rate, normal S1, S2, no murmur  Abdomen: normal bowel sounds, no masses, soft, non-tender  Extremities: moves extremities well and no edema  Pulses: pulses palpable and equal bilaterally (femoral, DP, PT)  Skin: no bleeding, bruising or rash  Neurologic: mental status orientated to person, place, time and situation, CN intact, no motor or sensory loss          Data Review:  Results from last 7 days   Lab Units 12/18/20  0718   WBC 10*3/mm3 4.15   HEMOGLOBIN g/dL 13.8   HEMATOCRIT % 42.7   PLATELETS 10*3/mm3 192     Results from last 7 days   Lab Units 12/18/20  0718   SODIUM mmol/L 143   POTASSIUM mmol/L 3.7   CHLORIDE mmol/L 109*   CO2 mmol/L 26.0   BUN mg/dL 10   CREATININE mg/dL 0.87   GLUCOSE mg/dL 95   CALCIUM mg/dL 8.7     Coagulation: No results found for: INR, APTT  Cardiac markers:     ABGs:       Invalid input(s): PO2      Imaging Results (Last 72 Hours)     ** No results found for the last 72 hours. **            Imaging:  Left heart catheterization from 12/18/2020 demonstrating a 60% left main lesion and no other significant coronary artery disease      Assessment:      Abnormal nuclear stress  test    Left main coronary artery disease  Hyperlipidemia  History of syncope    Plan:  1.  Left main coronary artery disease-she has an indication for coronary artery bypass grafting.  I have discussed with her the indications for surgery as well as the risks, benefits and alternatives.  She agrees as does Dr. Jasso that surgery is beneficial for this left main stenosis.  As she is chest pain-free, it is safe for her to be discharged and return for surgery.  We have tentatively scheduled her for January 5.    In the interim, as she lives in [facility], we will arrange for a transthoracic echo and carotid artery duplex prior to her discharge this afternoon.    Needs aspirin, statin, beta-blocker    2.  Syncope-her syncopal work-up in 2016 was negative, will repeat imaging as it is nearly 5 years old at this point.    3.  Hyperlipidemia-her total cholesterol is 227 with an LDL of 150, she will need a statin.    4.  Hemorrhagic cystitis-she is well controlled and takes Elmiron.  We will ask her to hold that 5 days before surgery    Risk of Mortality:  0.554%  Renal Failure:  0.351%  Permanent Stroke:  0.679%  Prolonged Ventilation:  2.087%  DSW Infection:  0.072%  Reoperation:  1.033%  Morbidity or Mortality:  3.731%  Short Length of Stay:  68.662%  Long Length of Stay:  1.044%      The ROS, past medical history, surgical history, family history, social history and vitals were reviewed by myself and corrected as needed.      I have reviewed, verified, and confirmed the above history and current status.  I have examined the patient and confirmed the above physical findings.Above plan and treatment regimen discussed in detail with patient.  Options of treatment, attendant risks vs benefits, and my recommendations were discussed and all questions answered.    Would like to thank Dr. Jasso for this consult.    Ion Moody MD  12/18/20  13:43 EST

## 2020-12-18 NOTE — H&P
[facility] Cardiology    Subjective:     Encounter Date:11/25/2020     Patient ID: Preeti Guerra is a 65 y.o. female.  Referring provider: Fatoumata Jasso, *     Reason for consultation: Abnormal stress test     PROBLEM LIST:  1. Chest pain              A. Echocardiogram 5/31/16, ZURI Sethi MD revealing NL LVEF 55-60%, mild MR, mild AR, trace TR.              B. Normal Carotid Duplex 5/31/16              C. Stress cardiolite 8/2020: mildly reduced exercise tolerance, stopped exercise due to fatigue, SOA. area of mild  fixed apical hypoperfusion was seen which was actually worse in the resting images and felt to be a normal variant.  The 3D reconstruction showed normal contractility in all segments. The calculated ejection fraction of 68%. No left  ventricular  dilation was seen with stress.  2. Abnormal EKG 8/18/20.  3. Hyperlipidemia- diet controlled.  4. GERD  5. Anxiety  6. Celiac Disease    HPI:      Preeti Guerra is a 65 y.o. female who presents for C plus/minus CBI. She had the above noted stress test which was indeterminate. She was seen in August at which time she continued to have FINNEY and chest pain. She was offered a stress test at that time but decided to think about it. She called back in October and decided to proceed with [facility].     No Known Allergies    No current facility-administered medications for this encounter.   Social History:  Social History     Tobacco Use   • Smoking status: Never Smoker   • Smokeless tobacco: Never Used   Substance Use Topics   • Alcohol use: No        Family History:  family history includes Breast cancer (age of onset: 70) in her maternal grandmother; Hypertension in her mother and sister; No Known Problems in her brother, daughter, father, maternal aunt, paternal aunt, paternal grandmother, and son.     Review of Systems: The following portions of the patient's history were reviewed and updated as appropriate: allergies,  current medications and problem list.    Constitution: Negative for chills, fatigue, fever, and generalized weakness.   Cardiovascular: See HPI  Respiratory: See HPI  HENT: Negative for ear pain, nosebleeds, and tinnitus.  Gastrointestinal: Negative for abdominal pain, constipation, diarrhea, nausea and vomiting.   Genitourinary: No urinary symptoms.   Musculoskeletal: Negative for muscle cramps.  Neurological: Negative for dizziness, headaches, loss of balance, numbness, and symptoms of stroke.  Psychiatric: Negative for depression, anxiety.           Objective:     Vitals:    12/18/20 0720   BP: 109/67   Pulse: 77   Temp: 98.1 °F (36.7 °C)   SpO2: 97%       GENERAL: This is a well-developed, well-nourished, female who is in no acute distress.   SKIN: Pink and warm without rash or abnormality noted.   HEENT: Head is normocephalic and atraumatic. Pupils are equal and reactive to light bilaterally. Mucous membranes are pink and moist.   NECK: Supple without lymphadenopathy or thyromegaly. There is no jugular venous distention at 30°.  LUNGS: Clear to auscultation bilaterally without wheezing, rhonchi, or rales noted.   CARDIOVASCULAR: The heart has a regular rate with a normal S1 and S2. There is no murmur, gallop, rub, or click appreciated. The PMI is nondisplaced. Carotid upstrokes are 2+ and symmetrical without bruits.   ABDOMEN: Soft and nondistended with positive bowel sounds x4. The patient denies tenderness of palpitation.   MUSCULOSKELETAL: There are no obvious bony abnormalities. Normal range of tenderness to palpation.   NEUROLOGICAL: Nonfocal. Alert and oriented x3.   PERIPHERAL VASCULAR: Femoral pulses are 2+ and symmetrical without bruits. Posterior tibial and dorsalis pedis pulses are 2+ and symmetrical. There is no peripheral edema.   PSYCH: Normal mood and affect.         ASSESSMENT       ICD-10-CM ICD-9-CM   1. Abnormal nuclear stress test  R94.39 794.39          PLAN     Patient presents for [facility]  plus/minus CBI using RRA. The risks, benefits, and alternatives of the procedure have been reviewed and the patient wishes to proceed.       Electronically signed by LAUREN Marion, 12/18/20, 7:35 AM AXEL.    Fatoumata Jasso MD, [facility]

## 2020-12-22 PROBLEM — I25.119 CORONARY ARTERY DISEASE INVOLVING NATIVE CORONARY ARTERY OF NATIVE HEART WITH ANGINA PECTORIS: Status: ACTIVE | Noted: 2020-12-22

## 2020-12-23 ENCOUNTER — PREP FOR SURGERY (OUTPATIENT)
Dept: OTHER | Facility: HOSPITAL | Age: 65
End: 2020-12-23

## 2020-12-23 DIAGNOSIS — I25.119 CORONARY ARTERY DISEASE INVOLVING NATIVE CORONARY ARTERY OF NATIVE HEART WITH ANGINA PECTORIS (HCC): Primary | ICD-10-CM

## 2020-12-23 RX ORDER — ACETAMINOPHEN 325 MG/1
650 TABLET ORAL EVERY 4 HOURS PRN
Status: CANCELLED | OUTPATIENT
Start: 2021-01-05

## 2020-12-23 RX ORDER — CHLORHEXIDINE GLUCONATE 500 MG/1
1 CLOTH TOPICAL EVERY 12 HOURS PRN
Status: CANCELLED | OUTPATIENT
Start: 2021-01-05

## 2020-12-23 RX ORDER — CHLORHEXIDINE GLUCONATE 500 MG/1
1 CLOTH TOPICAL EVERY 12 HOURS PRN
Status: CANCELLED | OUTPATIENT
Start: 2021-01-04

## 2020-12-23 RX ORDER — ASPIRIN 325 MG
325 TABLET ORAL NIGHTLY
Status: CANCELLED | OUTPATIENT
Start: 2021-01-04 | End: 2021-01-05

## 2020-12-23 RX ORDER — CHLORHEXIDINE GLUCONATE 0.12 MG/ML
15 RINSE ORAL ONCE
Status: CANCELLED | OUTPATIENT
Start: 2021-01-05 | End: 2021-01-05

## 2020-12-23 RX ORDER — NITROGLYCERIN 0.4 MG/1
0.4 TABLET SUBLINGUAL
Status: CANCELLED | OUTPATIENT
Start: 2021-01-05

## 2021-01-04 ENCOUNTER — HOSPITAL ENCOUNTER (OUTPATIENT)
Dept: PULMONOLOGY | Facility: HOSPITAL | Age: 66
Discharge: HOME | End: 2021-01-04

## 2021-01-04 ENCOUNTER — APPOINTMENT (OUTPATIENT)
Dept: PREADMISSION TESTING | Facility: HOSPITAL | Age: 66
End: 2021-01-04

## 2021-01-04 ENCOUNTER — HOSPITAL ENCOUNTER (OUTPATIENT)
Dept: GENERAL RADIOLOGY | Facility: HOSPITAL | Age: 66
Discharge: HOME | End: 2021-01-04

## 2021-01-04 ENCOUNTER — ANESTHESIA EVENT (OUTPATIENT)
Dept: PERIOP | Facility: HOSPITAL | Age: 66
End: 2021-01-04

## 2021-01-04 VITALS — WEIGHT: 156.8 LBS | BODY MASS INDEX: 26.77 KG/M2 | HEIGHT: 64 IN

## 2021-01-04 DIAGNOSIS — I25.119 CORONARY ARTERY DISEASE INVOLVING NATIVE CORONARY ARTERY OF NATIVE HEART WITH ANGINA PECTORIS (HCC): ICD-10-CM

## 2021-01-04 LAB
ABO GROUP BLD: NORMAL
ALBUMIN SERPL-MCNC: 4.6 G/DL (ref 3.5–5.2)
ALBUMIN/GLOB SERPL: 1.3 G/DL
ALP SERPL-CCNC: 106 U/L (ref 39–117)
ALT SERPL W P-5'-P-CCNC: 16 U/L (ref 1–33)
AMPHET+METHAMPHET UR QL: NEGATIVE
AMPHETAMINES UR QL: NEGATIVE
ANION GAP SERPL CALCULATED.3IONS-SCNC: 10 MMOL/L (ref 5–15)
APTT PPP: 37 SECONDS (ref 24–37)
AST SERPL-CCNC: 20 U/L (ref 1–32)
BARBITURATES UR QL SCN: NEGATIVE
BASOPHILS # BLD AUTO: 0.07 10*3/MM3 (ref 0–0.2)
BASOPHILS NFR BLD AUTO: 1.1 % (ref 0–1.5)
BENZODIAZ UR QL SCN: NEGATIVE
BILIRUB SERPL-MCNC: 0.5 MG/DL (ref 0–1.2)
BLD GP AB SCN SERPL QL: NEGATIVE
BUN SERPL-MCNC: 22 MG/DL (ref 8–23)
BUN/CREAT SERPL: 25.9 (ref 7–25)
BUPRENORPHINE SERPL-MCNC: NEGATIVE NG/ML
CALCIUM SPEC-SCNC: 9.9 MG/DL (ref 8.6–10.5)
CANNABINOIDS SERPL QL: NEGATIVE
CHLORIDE SERPL-SCNC: 100 MMOL/L (ref 98–107)
CO2 SERPL-SCNC: 29 MMOL/L (ref 22–29)
COCAINE UR QL: NEGATIVE
CREAT SERPL-MCNC: 0.85 MG/DL (ref 0.57–1)
DEPRECATED RDW RBC AUTO: 46.7 FL (ref 37–54)
EOSINOPHIL # BLD AUTO: 0.12 10*3/MM3 (ref 0–0.4)
EOSINOPHIL NFR BLD AUTO: 1.9 % (ref 0.3–6.2)
ERYTHROCYTE [DISTWIDTH] IN BLOOD BY AUTOMATED COUNT: 13.7 % (ref 12.3–15.4)
GFR SERPL CREATININE-BSD FRML MDRD: 67 ML/MIN/1.73
GLOBULIN UR ELPH-MCNC: 3.6 GM/DL
GLUCOSE SERPL-MCNC: 115 MG/DL (ref 65–99)
HBA1C MFR BLD: 5.3 % (ref 4.8–5.6)
HCT VFR BLD AUTO: 44.2 % (ref 34–46.6)
HGB BLD-MCNC: 14.3 G/DL (ref 12–15.9)
IMM GRANULOCYTES # BLD AUTO: 0.02 10*3/MM3 (ref 0–0.05)
IMM GRANULOCYTES NFR BLD AUTO: 0.3 % (ref 0–0.5)
INR PPP: 0.98 (ref 0.85–1.16)
LYMPHOCYTES # BLD AUTO: 1.27 10*3/MM3 (ref 0.7–3.1)
LYMPHOCYTES NFR BLD AUTO: 19.8 % (ref 19.6–45.3)
MAGNESIUM SERPL-MCNC: 3.3 MG/DL (ref 1.6–2.4)
MCH RBC QN AUTO: 29.7 PG (ref 26.6–33)
MCHC RBC AUTO-ENTMCNC: 32.4 G/DL (ref 31.5–35.7)
MCV RBC AUTO: 91.9 FL (ref 79–97)
METHADONE UR QL SCN: NEGATIVE
MONOCYTES # BLD AUTO: 0.38 10*3/MM3 (ref 0.1–0.9)
MONOCYTES NFR BLD AUTO: 5.9 % (ref 5–12)
NEUTROPHILS NFR BLD AUTO: 4.57 10*3/MM3 (ref 1.7–7)
NEUTROPHILS NFR BLD AUTO: 71 % (ref 42.7–76)
NRBC BLD AUTO-RTO: 0 /100 WBC (ref 0–0.2)
OPIATES UR QL: NEGATIVE
OXYCODONE UR QL SCN: NEGATIVE
PA ADP PRP-ACNC: 184 PRU
PCP UR QL SCN: NEGATIVE
PLATELET # BLD AUTO: 265 10*3/MM3 (ref 140–450)
PMV BLD AUTO: 10.1 FL (ref 6–12)
POTASSIUM SERPL-SCNC: 3.6 MMOL/L (ref 3.5–5.2)
PROPOXYPH UR QL: NEGATIVE
PROT SERPL-MCNC: 8.2 G/DL (ref 6–8.5)
PROTHROMBIN TIME: 12.7 SECONDS (ref 11.5–14)
RBC # BLD AUTO: 4.81 10*6/MM3 (ref 3.77–5.28)
RH BLD: POSITIVE
SODIUM SERPL-SCNC: 139 MMOL/L (ref 136–145)
T&S EXPIRATION DATE: NORMAL
TRICYCLICS UR QL SCN: NEGATIVE
WBC # BLD AUTO: 6.43 10*3/MM3 (ref 3.4–10.8)

## 2021-01-04 PROCEDURE — 86920 COMPATIBILITY TEST SPIN: CPT

## 2021-01-04 PROCEDURE — 85610 PROTHROMBIN TIME: CPT

## 2021-01-04 PROCEDURE — 86901 BLOOD TYPING SEROLOGIC RH(D): CPT

## 2021-01-04 PROCEDURE — 85730 THROMBOPLASTIN TIME PARTIAL: CPT

## 2021-01-04 PROCEDURE — 85025 COMPLETE CBC W/AUTO DIFF WBC: CPT

## 2021-01-04 PROCEDURE — 86900 BLOOD TYPING SEROLOGIC ABO: CPT

## 2021-01-04 PROCEDURE — 36415 COLL VENOUS BLD VENIPUNCTURE: CPT

## 2021-01-04 PROCEDURE — 83735 ASSAY OF MAGNESIUM: CPT

## 2021-01-04 PROCEDURE — 86850 RBC ANTIBODY SCREEN: CPT

## 2021-01-04 PROCEDURE — 94010 BREATHING CAPACITY TEST: CPT

## 2021-01-04 PROCEDURE — 71046 X-RAY EXAM CHEST 2 VIEWS: CPT

## 2021-01-04 PROCEDURE — 94010 BREATHING CAPACITY TEST: CPT | Performed by: INTERNAL MEDICINE

## 2021-01-04 PROCEDURE — 80053 COMPREHEN METABOLIC PANEL: CPT

## 2021-01-04 PROCEDURE — 83036 HEMOGLOBIN GLYCOSYLATED A1C: CPT

## 2021-01-04 PROCEDURE — 85576 BLOOD PLATELET AGGREGATION: CPT

## 2021-01-04 PROCEDURE — 80306 DRUG TEST PRSMV INSTRMNT: CPT

## 2021-01-04 RX ORDER — HYDROCHLOROTHIAZIDE 25 MG/1
12.5 TABLET ORAL DAILY
COMMUNITY

## 2021-01-04 RX ORDER — VIT C/HESPERIDIN/BIOFLAVONOIDS 500-100 MG
TABLET ORAL DAILY
COMMUNITY
End: 2021-07-28

## 2021-01-04 RX ORDER — UBIDECARENONE 100 MG
100 CAPSULE ORAL DAILY
COMMUNITY
End: 2021-07-28

## 2021-01-04 NOTE — PAT
Patient to apply Chlorhexadine wipes  to surgical area (as instructed) the night before procedure and the AM of procedure. Wipes provided.    Instructed patient to take 325 mg the night before heart surgery as per CT surgeon's order.  Patient and/or family verbalized understanding.    Patient was given Bactroban to use the night before surgery.    Room air sat 97%.

## 2021-01-05 ENCOUNTER — ANCILLARY PROCEDURE (OUTPATIENT)
Dept: PERIOP | Facility: HOSPITAL | Age: 66
End: 2021-01-05

## 2021-01-05 ENCOUNTER — ANESTHESIA (OUTPATIENT)
Dept: PERIOP | Facility: HOSPITAL | Age: 66
End: 2021-01-05

## 2021-01-05 ENCOUNTER — APPOINTMENT (OUTPATIENT)
Dept: GENERAL RADIOLOGY | Facility: HOSPITAL | Age: 66
End: 2021-01-05

## 2021-01-05 ENCOUNTER — HOSPITAL ENCOUNTER (INPATIENT)
Facility: HOSPITAL | Age: 66
LOS: 4 days | Discharge: HOME | End: 2021-01-09
Attending: STUDENT IN AN ORGANIZED HEALTH CARE EDUCATION/TRAINING PROGRAM | Admitting: STUDENT IN AN ORGANIZED HEALTH CARE EDUCATION/TRAINING PROGRAM

## 2021-01-05 DIAGNOSIS — I25.119 CORONARY ARTERY DISEASE INVOLVING NATIVE CORONARY ARTERY OF NATIVE HEART WITH ANGINA PECTORIS (HCC): Primary | ICD-10-CM

## 2021-01-05 LAB
ACT BLD: 147 SECONDS (ref 82–152)
ACT BLD: 164 SECONDS (ref 82–152)
ACT BLD: 527 SECONDS (ref 82–152)
ACT BLD: 670 SECONDS (ref 82–152)
ACT BLD: 714 SECONDS (ref 82–152)
ALBUMIN SERPL-MCNC: 4.1 G/DL (ref 3.5–5.2)
ALBUMIN SERPL-MCNC: 4.7 G/DL (ref 3.5–5.2)
ANION GAP SERPL CALCULATED.3IONS-SCNC: 10 MMOL/L (ref 5–15)
ANION GAP SERPL CALCULATED.3IONS-SCNC: 12 MMOL/L (ref 5–15)
APTT PPP: 43.5 SECONDS (ref 24–37)
APTT PPP: 44.8 SECONDS (ref 24–37)
ARTERIAL PATENCY WRIST A: ABNORMAL
ARTERIAL PATENCY WRIST A: ABNORMAL
ATMOSPHERIC PRESS: ABNORMAL MM[HG]
ATMOSPHERIC PRESS: ABNORMAL MM[HG]
BASE EXCESS BLDA CALC-SCNC: 0.7 MMOL/L (ref 0–2)
BASE EXCESS BLDA CALC-SCNC: 1 MMOL/L (ref 0–2)
BASOPHILS # BLD AUTO: 0.05 10*3/MM3 (ref 0–0.2)
BASOPHILS NFR BLD AUTO: 0.5 % (ref 0–1.5)
BDY SITE: ABNORMAL
BDY SITE: ABNORMAL
BODY TEMPERATURE: 37 C
BODY TEMPERATURE: 37 C
BUN SERPL-MCNC: 12 MG/DL (ref 8–23)
BUN SERPL-MCNC: 12 MG/DL (ref 8–23)
BUN/CREAT SERPL: 14.5 (ref 7–25)
BUN/CREAT SERPL: 15.4 (ref 7–25)
CA-I SERPL ISE-MCNC: 1.5 MMOL/L (ref 1.12–1.32)
CALCIUM SPEC-SCNC: 10.2 MG/DL (ref 8.6–10.5)
CALCIUM SPEC-SCNC: 9.4 MG/DL (ref 8.6–10.5)
CHLORIDE SERPL-SCNC: 105 MMOL/L (ref 98–107)
CHLORIDE SERPL-SCNC: 105 MMOL/L (ref 98–107)
CO2 BLDA-SCNC: 26.3 MMOL/L (ref 22–33)
CO2 BLDA-SCNC: 27.8 MMOL/L (ref 22–33)
CO2 SERPL-SCNC: 24 MMOL/L (ref 22–29)
CO2 SERPL-SCNC: 26 MMOL/L (ref 22–29)
COHGB MFR BLD: 0.7 % (ref 0–2)
COHGB MFR BLD: 1 % (ref 0–2)
CPAP: 5 CMH2O
CREAT SERPL-MCNC: 0.78 MG/DL (ref 0.57–1)
CREAT SERPL-MCNC: 0.83 MG/DL (ref 0.57–1)
DEPRECATED RDW RBC AUTO: 47 FL (ref 37–54)
DEPRECATED RDW RBC AUTO: 47.3 FL (ref 37–54)
DEPRECATED RDW RBC AUTO: 48.6 FL (ref 37–54)
DEPRECATED RDW RBC AUTO: 49.2 FL (ref 37–54)
EOSINOPHIL # BLD AUTO: 0.06 10*3/MM3 (ref 0–0.4)
EOSINOPHIL NFR BLD AUTO: 0.6 % (ref 0.3–6.2)
EPAP: 0
EPAP: 0
ERYTHROCYTE [DISTWIDTH] IN BLOOD BY AUTOMATED COUNT: 13.8 % (ref 12.3–15.4)
ERYTHROCYTE [DISTWIDTH] IN BLOOD BY AUTOMATED COUNT: 13.8 % (ref 12.3–15.4)
ERYTHROCYTE [DISTWIDTH] IN BLOOD BY AUTOMATED COUNT: 14.1 % (ref 12.3–15.4)
ERYTHROCYTE [DISTWIDTH] IN BLOOD BY AUTOMATED COUNT: 14.2 % (ref 12.3–15.4)
FIBRINOGEN PPP-MCNC: 186 MG/DL (ref 215–430)
GFR SERPL CREATININE-BSD FRML MDRD: 69 ML/MIN/1.73
GFR SERPL CREATININE-BSD FRML MDRD: 74 ML/MIN/1.73
GLUCOSE BLDC GLUCOMTR-MCNC: 101 MG/DL (ref 70–130)
GLUCOSE BLDC GLUCOMTR-MCNC: 104 MG/DL (ref 70–130)
GLUCOSE BLDC GLUCOMTR-MCNC: 108 MG/DL (ref 70–130)
GLUCOSE BLDC GLUCOMTR-MCNC: 119 MG/DL (ref 70–130)
GLUCOSE BLDC GLUCOMTR-MCNC: 120 MG/DL (ref 70–130)
GLUCOSE BLDC GLUCOMTR-MCNC: 129 MG/DL (ref 70–130)
GLUCOSE BLDC GLUCOMTR-MCNC: 133 MG/DL (ref 70–130)
GLUCOSE BLDC GLUCOMTR-MCNC: 134 MG/DL (ref 70–130)
GLUCOSE BLDC GLUCOMTR-MCNC: 136 MG/DL (ref 70–130)
GLUCOSE BLDC GLUCOMTR-MCNC: 148 MG/DL (ref 70–130)
GLUCOSE BLDC GLUCOMTR-MCNC: 150 MG/DL (ref 70–130)
GLUCOSE BLDC GLUCOMTR-MCNC: 94 MG/DL (ref 70–130)
GLUCOSE SERPL-MCNC: 136 MG/DL (ref 65–99)
GLUCOSE SERPL-MCNC: 99 MG/DL (ref 65–99)
HCO3 BLDA-SCNC: 25.1 MMOL/L (ref 20–26)
HCO3 BLDA-SCNC: 26.4 MMOL/L (ref 20–26)
HCT VFR BLD AUTO: 26.8 % (ref 34–46.6)
HCT VFR BLD AUTO: 27.2 % (ref 34–46.6)
HCT VFR BLD AUTO: 27.2 % (ref 34–46.6)
HCT VFR BLD AUTO: 27.4 % (ref 34–46.6)
HCT VFR BLD AUTO: 27.4 % (ref 34–46.6)
HCT VFR BLD CALC: 26.6 %
HCT VFR BLD CALC: 28.5 %
HGB BLD-MCNC: 8.6 G/DL (ref 12–15.9)
HGB BLD-MCNC: 8.7 G/DL (ref 12–15.9)
HGB BLD-MCNC: 9 G/DL (ref 12–15.9)
HGB BLDA-MCNC: 8.7 G/DL (ref 14–18)
HGB BLDA-MCNC: 9.3 G/DL (ref 14–18)
IMM GRANULOCYTES # BLD AUTO: 0.05 10*3/MM3 (ref 0–0.05)
IMM GRANULOCYTES NFR BLD AUTO: 0.5 % (ref 0–0.5)
INHALED O2 CONCENTRATION: 100 %
INHALED O2 CONCENTRATION: 30 %
INR PPP: 1.61 (ref 0.85–1.16)
INR PPP: 1.61 (ref 0.85–1.16)
IPAP: 0
IPAP: 0
LYMPHOCYTES # BLD AUTO: 0.67 10*3/MM3 (ref 0.7–3.1)
LYMPHOCYTES NFR BLD AUTO: 6.2 % (ref 19.6–45.3)
Lab: ABNORMAL
MAGNESIUM SERPL-MCNC: 1.6 MG/DL (ref 1.6–2.4)
MAGNESIUM SERPL-MCNC: 1.9 MG/DL (ref 1.6–2.4)
MCH RBC QN AUTO: 29.9 PG (ref 26.6–33)
MCH RBC QN AUTO: 29.9 PG (ref 26.6–33)
MCH RBC QN AUTO: 30.3 PG (ref 26.6–33)
MCH RBC QN AUTO: 30.9 PG (ref 26.6–33)
MCHC RBC AUTO-ENTMCNC: 31.8 G/DL (ref 31.5–35.7)
MCHC RBC AUTO-ENTMCNC: 32 G/DL (ref 31.5–35.7)
MCHC RBC AUTO-ENTMCNC: 32.5 G/DL (ref 31.5–35.7)
MCHC RBC AUTO-ENTMCNC: 32.8 G/DL (ref 31.5–35.7)
MCV RBC AUTO: 92.1 FL (ref 79–97)
MCV RBC AUTO: 94.2 FL (ref 79–97)
MCV RBC AUTO: 94.2 FL (ref 79–97)
MCV RBC AUTO: 94.8 FL (ref 79–97)
METHGB BLD QL: 0.5 % (ref 0–1.5)
METHGB BLD QL: 0.6 % (ref 0–1.5)
MODALITY: ABNORMAL
MODALITY: ABNORMAL
MONOCYTES # BLD AUTO: 0.6 10*3/MM3 (ref 0.1–0.9)
MONOCYTES NFR BLD AUTO: 5.6 % (ref 5–12)
NEUTROPHILS NFR BLD AUTO: 86.6 % (ref 42.7–76)
NEUTROPHILS NFR BLD AUTO: 9.32 10*3/MM3 (ref 1.7–7)
NOTE: ABNORMAL
NOTE: ABNORMAL
NOTIFIED BY: ABNORMAL
NOTIFIED WHO: ABNORMAL
NRBC BLD AUTO-RTO: 0 /100 WBC (ref 0–0.2)
OXYHGB MFR BLDV: 94.4 % (ref 94–99)
OXYHGB MFR BLDV: ABNORMAL %
PAW @ PEAK INSP FLOW SETTING VENT: 0 CMH2O
PAW @ PEAK INSP FLOW SETTING VENT: 0 CMH2O
PCO2 BLDA: 37 MM HG (ref 35–45)
PCO2 BLDA: 46.7 MM HG (ref 35–45)
PCO2 TEMP ADJ BLD: 37 MM HG (ref 35–45)
PCO2 TEMP ADJ BLD: 46.7 MM HG (ref 35–45)
PEEP RESPIRATORY: 5 CM[H2O]
PH BLDA: 7.36 PH UNITS (ref 7.35–7.45)
PH BLDA: 7.44 PH UNITS (ref 7.35–7.45)
PH, TEMP CORRECTED: 7.36 PH UNITS
PH, TEMP CORRECTED: 7.44 PH UNITS
PHOSPHATE SERPL-MCNC: 2.7 MG/DL (ref 2.5–4.5)
PHOSPHATE SERPL-MCNC: 3.5 MG/DL (ref 2.5–4.5)
PLATELET # BLD AUTO: 116 10*3/MM3 (ref 140–450)
PLATELET # BLD AUTO: 119 10*3/MM3 (ref 140–450)
PLATELET # BLD AUTO: 141 10*3/MM3 (ref 140–450)
PLATELET # BLD AUTO: 155 10*3/MM3 (ref 140–450)
PMV BLD AUTO: 10.2 FL (ref 6–12)
PMV BLD AUTO: 10.5 FL (ref 6–12)
PMV BLD AUTO: 9.8 FL (ref 6–12)
PMV BLD AUTO: 9.8 FL (ref 6–12)
PO2 BLDA: 333 MM HG (ref 83–108)
PO2 BLDA: 80 MM HG (ref 83–108)
PO2 TEMP ADJ BLD: 333 MM HG (ref 83–108)
PO2 TEMP ADJ BLD: 80 MM HG (ref 83–108)
POTASSIUM SERPL-SCNC: 3.5 MMOL/L (ref 3.5–5.2)
POTASSIUM SERPL-SCNC: 3.7 MMOL/L (ref 3.5–5.2)
POTASSIUM SERPL-SCNC: 3.8 MMOL/L (ref 3.5–5.2)
PROTHROMBIN TIME: 18.8 SECONDS (ref 11.5–14)
PROTHROMBIN TIME: 18.9 SECONDS (ref 11.5–14)
PSV: 10 CMH2O
PSV: 10 CMH2O
QT INTERVAL: 402 MS
QTC INTERVAL: 466 MS
RBC # BLD AUTO: 2.87 10*6/MM3 (ref 3.77–5.28)
RBC # BLD AUTO: 2.91 10*6/MM3 (ref 3.77–5.28)
SODIUM SERPL-SCNC: 141 MMOL/L (ref 136–145)
SODIUM SERPL-SCNC: 141 MMOL/L (ref 136–145)
TOTAL RATE: 0 BREATHS/MINUTE
TOTAL RATE: 16 BREATHS/MINUTE
VENTILATOR MODE: ABNORMAL
VENTILATOR MODE: ABNORMAL
VT ON VENT VENT: 0.4 ML
WBC # BLD AUTO: 10.75 10*3/MM3 (ref 3.4–10.8)
WBC # BLD AUTO: 5.36 10*3/MM3 (ref 3.4–10.8)
WBC # BLD AUTO: 6.13 10*3/MM3 (ref 3.4–10.8)
WBC # BLD AUTO: 8.53 10*3/MM3 (ref 3.4–10.8)

## 2021-01-05 PROCEDURE — 33508 ENDOSCOPIC VEIN HARVEST: CPT | Performed by: STUDENT IN AN ORGANIZED HEALTH CARE EDUCATION/TRAINING PROGRAM

## 2021-01-05 PROCEDURE — 25010000003 CEFUROXIME SODIUM 1.5 G RECONSTITUTED SOLUTION: Performed by: PHYSICIAN ASSISTANT

## 2021-01-05 PROCEDURE — 99223 1ST HOSP IP/OBS HIGH 75: CPT | Performed by: INTERNAL MEDICINE

## 2021-01-05 PROCEDURE — 82805 BLOOD GASES W/O2 SATURATION: CPT

## 2021-01-05 PROCEDURE — 85384 FIBRINOGEN ACTIVITY: CPT | Performed by: STUDENT IN AN ORGANIZED HEALTH CARE EDUCATION/TRAINING PROGRAM

## 2021-01-05 PROCEDURE — 85014 HEMATOCRIT: CPT

## 2021-01-05 PROCEDURE — 85025 COMPLETE CBC W/AUTO DIFF WBC: CPT | Performed by: PHYSICIAN ASSISTANT

## 2021-01-05 PROCEDURE — 85347 COAGULATION TIME ACTIVATED: CPT

## 2021-01-05 PROCEDURE — 5A1221Z PERFORMANCE OF CARDIAC OUTPUT, CONTINUOUS: ICD-10-PCS | Performed by: STUDENT IN AN ORGANIZED HEALTH CARE EDUCATION/TRAINING PROGRAM

## 2021-01-05 PROCEDURE — B24BZZ4 ULTRASONOGRAPHY OF HEART WITH AORTA, TRANSESOPHAGEAL: ICD-10-PCS | Performed by: ANESTHESIOLOGY

## 2021-01-05 PROCEDURE — C1713 ANCHOR/SCREW BN/BN,TIS/BN: HCPCS | Performed by: STUDENT IN AN ORGANIZED HEALTH CARE EDUCATION/TRAINING PROGRAM

## 2021-01-05 PROCEDURE — 33517 CABG ARTERY-VEIN SINGLE: CPT | Performed by: STUDENT IN AN ORGANIZED HEALTH CARE EDUCATION/TRAINING PROGRAM

## 2021-01-05 PROCEDURE — 85027 COMPLETE CBC AUTOMATED: CPT | Performed by: STUDENT IN AN ORGANIZED HEALTH CARE EDUCATION/TRAINING PROGRAM

## 2021-01-05 PROCEDURE — 94002 VENT MGMT INPAT INIT DAY: CPT

## 2021-01-05 PROCEDURE — 94799 UNLISTED PULMONARY SVC/PX: CPT

## 2021-01-05 PROCEDURE — C1894 INTRO/SHEATH, NON-LASER: HCPCS | Performed by: STUDENT IN AN ORGANIZED HEALTH CARE EDUCATION/TRAINING PROGRAM

## 2021-01-05 PROCEDURE — 84132 ASSAY OF SERUM POTASSIUM: CPT | Performed by: STUDENT IN AN ORGANIZED HEALTH CARE EDUCATION/TRAINING PROGRAM

## 2021-01-05 PROCEDURE — 80069 RENAL FUNCTION PANEL: CPT | Performed by: PHYSICIAN ASSISTANT

## 2021-01-05 PROCEDURE — 85027 COMPLETE CBC AUTOMATED: CPT | Performed by: PHYSICIAN ASSISTANT

## 2021-01-05 PROCEDURE — 83050 HGB METHEMOGLOBIN QUAN: CPT

## 2021-01-05 PROCEDURE — 84295 ASSAY OF SERUM SODIUM: CPT

## 2021-01-05 PROCEDURE — C1751 CATH, INF, PER/CENT/MIDLINE: HCPCS | Performed by: ANESTHESIOLOGY

## 2021-01-05 PROCEDURE — A4648 IMPLANTABLE TISSUE MARKER: HCPCS | Performed by: STUDENT IN AN ORGANIZED HEALTH CARE EDUCATION/TRAINING PROGRAM

## 2021-01-05 PROCEDURE — 82962 GLUCOSE BLOOD TEST: CPT

## 2021-01-05 PROCEDURE — 82330 ASSAY OF CALCIUM: CPT | Performed by: PHYSICIAN ASSISTANT

## 2021-01-05 PROCEDURE — 84132 ASSAY OF SERUM POTASSIUM: CPT

## 2021-01-05 PROCEDURE — 25010000002 PAPAVERINE PER 60 MG: Performed by: STUDENT IN AN ORGANIZED HEALTH CARE EDUCATION/TRAINING PROGRAM

## 2021-01-05 PROCEDURE — 85610 PROTHROMBIN TIME: CPT | Performed by: STUDENT IN AN ORGANIZED HEALTH CARE EDUCATION/TRAINING PROGRAM

## 2021-01-05 PROCEDURE — C1729 CATH, DRAINAGE: HCPCS | Performed by: STUDENT IN AN ORGANIZED HEALTH CARE EDUCATION/TRAINING PROGRAM

## 2021-01-05 PROCEDURE — 25010000002 MIDAZOLAM PER 1 MG: Performed by: ANESTHESIOLOGY

## 2021-01-05 PROCEDURE — 85018 HEMOGLOBIN: CPT | Performed by: STUDENT IN AN ORGANIZED HEALTH CARE EDUCATION/TRAINING PROGRAM

## 2021-01-05 PROCEDURE — 33533 CABG ARTERIAL SINGLE: CPT | Performed by: STUDENT IN AN ORGANIZED HEALTH CARE EDUCATION/TRAINING PROGRAM

## 2021-01-05 PROCEDURE — 82330 ASSAY OF CALCIUM: CPT

## 2021-01-05 PROCEDURE — 33508 ENDOSCOPIC VEIN HARVEST: CPT | Performed by: THORACIC SURGERY (CARDIOTHORACIC VASCULAR SURGERY)

## 2021-01-05 PROCEDURE — 71045 X-RAY EXAM CHEST 1 VIEW: CPT

## 2021-01-05 PROCEDURE — 85014 HEMATOCRIT: CPT | Performed by: STUDENT IN AN ORGANIZED HEALTH CARE EDUCATION/TRAINING PROGRAM

## 2021-01-05 PROCEDURE — 33533 CABG ARTERIAL SINGLE: CPT | Performed by: THORACIC SURGERY (CARDIOTHORACIC VASCULAR SURGERY)

## 2021-01-05 PROCEDURE — C1751 CATH, INF, PER/CENT/MIDLINE: HCPCS | Performed by: STUDENT IN AN ORGANIZED HEALTH CARE EDUCATION/TRAINING PROGRAM

## 2021-01-05 PROCEDURE — 25010000002 PROPOFOL 10 MG/ML EMULSION: Performed by: ANESTHESIOLOGY

## 2021-01-05 PROCEDURE — 85610 PROTHROMBIN TIME: CPT | Performed by: PHYSICIAN ASSISTANT

## 2021-01-05 PROCEDURE — 82375 ASSAY CARBOXYHB QUANT: CPT

## 2021-01-05 PROCEDURE — 25810000003 DEXTROSE 5 % WITH KCL 20 MEQ 20-5 MEQ/L-% SOLUTION: Performed by: PHYSICIAN ASSISTANT

## 2021-01-05 PROCEDURE — 25010000002 METOCLOPRAMIDE PER 10 MG: Performed by: NURSE PRACTITIONER

## 2021-01-05 PROCEDURE — 82803 BLOOD GASES ANY COMBINATION: CPT

## 2021-01-05 PROCEDURE — 021009W BYPASS CORONARY ARTERY, ONE ARTERY FROM AORTA WITH AUTOLOGOUS VENOUS TISSUE, OPEN APPROACH: ICD-10-PCS | Performed by: STUDENT IN AN ORGANIZED HEALTH CARE EDUCATION/TRAINING PROGRAM

## 2021-01-05 PROCEDURE — 02100Z9 BYPASS CORONARY ARTERY, ONE ARTERY FROM LEFT INTERNAL MAMMARY, OPEN APPROACH: ICD-10-PCS | Performed by: STUDENT IN AN ORGANIZED HEALTH CARE EDUCATION/TRAINING PROGRAM

## 2021-01-05 PROCEDURE — 25010000002 HEPARIN (PORCINE) PER 1000 UNITS: Performed by: ANESTHESIOLOGY

## 2021-01-05 PROCEDURE — 25010000002 CEFUROXIME: Performed by: PHYSICIAN ASSISTANT

## 2021-01-05 PROCEDURE — 25010000002 ALBUMIN HUMAN 5% PER 50 ML: Performed by: ANESTHESIOLOGY

## 2021-01-05 PROCEDURE — 33517 CABG ARTERY-VEIN SINGLE: CPT | Performed by: THORACIC SURGERY (CARDIOTHORACIC VASCULAR SURGERY)

## 2021-01-05 PROCEDURE — P9041 ALBUMIN (HUMAN),5%, 50ML: HCPCS | Performed by: ANESTHESIOLOGY

## 2021-01-05 PROCEDURE — 85730 THROMBOPLASTIN TIME PARTIAL: CPT | Performed by: PHYSICIAN ASSISTANT

## 2021-01-05 PROCEDURE — 25010000002 MORPHINE PER 10 MG: Performed by: STUDENT IN AN ORGANIZED HEALTH CARE EDUCATION/TRAINING PROGRAM

## 2021-01-05 PROCEDURE — 25010000002 ALBUMIN HUMAN 5% PER 50 ML: Performed by: PHYSICIAN ASSISTANT

## 2021-01-05 PROCEDURE — 06BP4ZZ EXCISION OF RIGHT SAPHENOUS VEIN, PERCUTANEOUS ENDOSCOPIC APPROACH: ICD-10-PCS | Performed by: STUDENT IN AN ORGANIZED HEALTH CARE EDUCATION/TRAINING PROGRAM

## 2021-01-05 PROCEDURE — 83735 ASSAY OF MAGNESIUM: CPT | Performed by: PHYSICIAN ASSISTANT

## 2021-01-05 PROCEDURE — 82947 ASSAY GLUCOSE BLOOD QUANT: CPT

## 2021-01-05 PROCEDURE — 93318 ECHO TRANSESOPHAGEAL INTRAOP: CPT

## 2021-01-05 PROCEDURE — 25010000002 PROTAMINE SULFATE PER 10 MG: Performed by: ANESTHESIOLOGY

## 2021-01-05 PROCEDURE — 99233 SBSQ HOSP IP/OBS HIGH 50: CPT | Performed by: INTERNAL MEDICINE

## 2021-01-05 PROCEDURE — P9041 ALBUMIN (HUMAN),5%, 50ML: HCPCS | Performed by: PHYSICIAN ASSISTANT

## 2021-01-05 PROCEDURE — 25010000002 ONDANSETRON PER 1 MG: Performed by: PHYSICIAN ASSISTANT

## 2021-01-05 PROCEDURE — 25010000002 HEPARIN (PORCINE) PER 1000 UNITS: Performed by: STUDENT IN AN ORGANIZED HEALTH CARE EDUCATION/TRAINING PROGRAM

## 2021-01-05 PROCEDURE — 93005 ELECTROCARDIOGRAM TRACING: CPT | Performed by: PHYSICIAN ASSISTANT

## 2021-01-05 PROCEDURE — 85730 THROMBOPLASTIN TIME PARTIAL: CPT | Performed by: STUDENT IN AN ORGANIZED HEALTH CARE EDUCATION/TRAINING PROGRAM

## 2021-01-05 PROCEDURE — 25010000003 POTASSIUM CHLORIDE PER 2 MEQ: Performed by: PHYSICIAN ASSISTANT

## 2021-01-05 DEVICE — LIGACLIP MCA MULTIPLE CLIP APPLIERS, 20 SMALL CLIPS
Type: IMPLANTABLE DEVICE | Site: CHEST | Status: FUNCTIONAL
Brand: LIGACLIP

## 2021-01-05 DEVICE — WR SUT NONABS MF SS V40 1/2CIR TC 6/0 18IN M649G: Type: IMPLANTABLE DEVICE | Site: STERNUM | Status: FUNCTIONAL

## 2021-01-05 DEVICE — DISK-SHAPED STYLE, SILICONE (1 PER STERILE PKG)
Type: IMPLANTABLE DEVICE | Site: HEART | Status: FUNCTIONAL
Brand: SCANLAN® RADIOMARK® GRAFT MARKERS

## 2021-01-05 DEVICE — HEMOST ABS SURGIFOAM SZ100 8X12 10MM: Type: IMPLANTABLE DEVICE | Site: CHEST | Status: FUNCTIONAL

## 2021-01-05 RX ORDER — ATORVASTATIN CALCIUM 40 MG/1
40 TABLET, FILM COATED ORAL NIGHTLY
Status: DISCONTINUED | OUTPATIENT
Start: 2021-01-05 | End: 2021-01-08

## 2021-01-05 RX ORDER — CHLORHEXIDINE GLUCONATE 500 MG/1
1 CLOTH TOPICAL EVERY 12 HOURS PRN
Status: DISCONTINUED | OUTPATIENT
Start: 2021-01-05 | End: 2021-01-05

## 2021-01-05 RX ORDER — NALOXONE HYDROCHLORIDE 0.4 MG/ML
0.2 INJECTION, SOLUTION INTRAMUSCULAR; INTRAVENOUS; SUBCUTANEOUS AS NEEDED
Status: DISCONTINUED | OUTPATIENT
Start: 2021-01-05 | End: 2021-01-09 | Stop reason: HOSPADM

## 2021-01-05 RX ORDER — PROTAMINE SULFATE 10 MG/ML
50 INJECTION, SOLUTION INTRAVENOUS ONCE
Status: DISCONTINUED | OUTPATIENT
Start: 2021-01-05 | End: 2021-01-05

## 2021-01-05 RX ORDER — ALBUTEROL SULFATE 2.5 MG/3ML
2.5 SOLUTION RESPIRATORY (INHALATION) EVERY 4 HOURS PRN
Status: ACTIVE | OUTPATIENT
Start: 2021-01-05 | End: 2021-01-06

## 2021-01-05 RX ORDER — HYDROCODONE BITARTRATE AND ACETAMINOPHEN 7.5; 325 MG/1; MG/1
1 TABLET ORAL EVERY 4 HOURS PRN
Status: DISCONTINUED | OUTPATIENT
Start: 2021-01-05 | End: 2021-01-09 | Stop reason: HOSPADM

## 2021-01-05 RX ORDER — ASPIRIN 325 MG
325 TABLET ORAL ONCE
Status: COMPLETED | OUTPATIENT
Start: 2021-01-05 | End: 2021-01-05

## 2021-01-05 RX ORDER — MIDAZOLAM HYDROCHLORIDE 1 MG/ML
INJECTION INTRAMUSCULAR; INTRAVENOUS AS NEEDED
Status: DISCONTINUED | OUTPATIENT
Start: 2021-01-05 | End: 2021-01-05 | Stop reason: SURG

## 2021-01-05 RX ORDER — METOCLOPRAMIDE HYDROCHLORIDE 5 MG/ML
10 INJECTION INTRAMUSCULAR; INTRAVENOUS ONCE
Status: COMPLETED | OUTPATIENT
Start: 2021-01-05 | End: 2021-01-05

## 2021-01-05 RX ORDER — POTASSIUM CHLORIDE 29.8 MG/ML
20 INJECTION INTRAVENOUS
Status: DISCONTINUED | OUTPATIENT
Start: 2021-01-05 | End: 2021-01-07

## 2021-01-05 RX ORDER — METOPROLOL TARTRATE 5 MG/5ML
2.5 INJECTION INTRAVENOUS EVERY 6 HOURS SCHEDULED
Status: ACTIVE | OUTPATIENT
Start: 2021-01-05 | End: 2021-01-06

## 2021-01-05 RX ORDER — NOREPINEPHRINE BIT/0.9 % NACL 8 MG/250ML
.02-.3 INFUSION BOTTLE (ML) INTRAVENOUS CONTINUOUS PRN
Status: DISCONTINUED | OUTPATIENT
Start: 2021-01-05 | End: 2021-01-07

## 2021-01-05 RX ORDER — GLYCOPYRROLATE 0.2 MG/ML
INJECTION INTRAMUSCULAR; INTRAVENOUS AS NEEDED
Status: DISCONTINUED | OUTPATIENT
Start: 2021-01-05 | End: 2021-01-05 | Stop reason: SURG

## 2021-01-05 RX ORDER — MORPHINE SULFATE 2 MG/ML
2 INJECTION, SOLUTION INTRAMUSCULAR; INTRAVENOUS
Status: DISCONTINUED | OUTPATIENT
Start: 2021-01-05 | End: 2021-01-05

## 2021-01-05 RX ORDER — ACETAMINOPHEN 325 MG/1
650 TABLET ORAL EVERY 4 HOURS PRN
Status: DISCONTINUED | OUTPATIENT
Start: 2021-01-05 | End: 2021-01-09 | Stop reason: HOSPADM

## 2021-01-05 RX ORDER — ASPIRIN 325 MG
325 TABLET ORAL NIGHTLY
Status: DISCONTINUED | OUTPATIENT
Start: 2021-01-05 | End: 2021-01-05 | Stop reason: SDUPTHER

## 2021-01-05 RX ORDER — SUFENTANIL CITRATE 50 UG/ML
INJECTION EPIDURAL; INTRAVENOUS AS NEEDED
Status: DISCONTINUED | OUTPATIENT
Start: 2021-01-05 | End: 2021-01-05 | Stop reason: SURG

## 2021-01-05 RX ORDER — FAMOTIDINE 20 MG/1
20 TABLET, FILM COATED ORAL ONCE
Status: COMPLETED | OUTPATIENT
Start: 2021-01-05 | End: 2021-01-05

## 2021-01-05 RX ORDER — POTASSIUM CHLORIDE 750 MG/1
40 CAPSULE, EXTENDED RELEASE ORAL AS NEEDED
Status: DISCONTINUED | OUTPATIENT
Start: 2021-01-05 | End: 2021-01-07

## 2021-01-05 RX ORDER — CHLORHEXIDINE GLUCONATE 0.12 MG/ML
15 RINSE ORAL ONCE
Status: COMPLETED | OUTPATIENT
Start: 2021-01-05 | End: 2021-01-05

## 2021-01-05 RX ORDER — ESMOLOL HYDROCHLORIDE 10 MG/ML
INJECTION INTRAVENOUS AS NEEDED
Status: DISCONTINUED | OUTPATIENT
Start: 2021-01-05 | End: 2021-01-05 | Stop reason: SURG

## 2021-01-05 RX ORDER — ALBUMIN, HUMAN INJ 5% 5 %
500 SOLUTION INTRAVENOUS AS NEEDED
Status: COMPLETED | OUTPATIENT
Start: 2021-01-05 | End: 2021-01-05

## 2021-01-05 RX ORDER — MEPERIDINE HYDROCHLORIDE 25 MG/ML
25 INJECTION INTRAMUSCULAR; INTRAVENOUS; SUBCUTANEOUS EVERY 4 HOURS PRN
Status: DISCONTINUED | OUTPATIENT
Start: 2021-01-05 | End: 2021-01-07

## 2021-01-05 RX ORDER — ALBUMIN, HUMAN INJ 5% 5 %
SOLUTION INTRAVENOUS
Status: DISPENSED
Start: 2021-01-05 | End: 2021-01-05

## 2021-01-05 RX ORDER — PROTAMINE SULFATE 10 MG/ML
INJECTION, SOLUTION INTRAVENOUS AS NEEDED
Status: DISCONTINUED | OUTPATIENT
Start: 2021-01-05 | End: 2021-01-05 | Stop reason: SURG

## 2021-01-05 RX ORDER — PROPOFOL 10 MG/ML
VIAL (ML) INTRAVENOUS CONTINUOUS PRN
Status: DISCONTINUED | OUTPATIENT
Start: 2021-01-05 | End: 2021-01-05 | Stop reason: SURG

## 2021-01-05 RX ORDER — ROCURONIUM BROMIDE 10 MG/ML
INJECTION, SOLUTION INTRAVENOUS AS NEEDED
Status: DISCONTINUED | OUTPATIENT
Start: 2021-01-05 | End: 2021-01-05 | Stop reason: SURG

## 2021-01-05 RX ORDER — ALBUMIN, HUMAN INJ 5% 5 %
SOLUTION INTRAVENOUS CONTINUOUS PRN
Status: DISCONTINUED | OUTPATIENT
Start: 2021-01-05 | End: 2021-01-05 | Stop reason: SURG

## 2021-01-05 RX ORDER — ACETAMINOPHEN 325 MG/1
650 TABLET ORAL EVERY 4 HOURS PRN
Status: DISCONTINUED | OUTPATIENT
Start: 2021-01-05 | End: 2021-01-05 | Stop reason: HOSPADM

## 2021-01-05 RX ORDER — LIDOCAINE HYDROCHLORIDE 10 MG/ML
0.5 INJECTION, SOLUTION EPIDURAL; INFILTRATION; INTRACAUDAL; PERINEURAL ONCE AS NEEDED
Status: COMPLETED | OUTPATIENT
Start: 2021-01-05 | End: 2021-01-05

## 2021-01-05 RX ORDER — SODIUM CHLORIDE, SODIUM LACTATE, POTASSIUM CHLORIDE, CALCIUM CHLORIDE 600; 310; 30; 20 MG/100ML; MG/100ML; MG/100ML; MG/100ML
9 INJECTION, SOLUTION INTRAVENOUS CONTINUOUS
Status: DISCONTINUED | OUTPATIENT
Start: 2021-01-05 | End: 2021-01-05

## 2021-01-05 RX ORDER — SODIUM CHLORIDE 0.9 % (FLUSH) 0.9 %
10 SYRINGE (ML) INJECTION EVERY 12 HOURS SCHEDULED
Status: DISCONTINUED | OUTPATIENT
Start: 2021-01-05 | End: 2021-01-05 | Stop reason: HOSPADM

## 2021-01-05 RX ORDER — AMINOCAPROIC ACID 250 MG/ML
INJECTION, SOLUTION INTRAVENOUS AS NEEDED
Status: DISCONTINUED | OUTPATIENT
Start: 2021-01-05 | End: 2021-01-05 | Stop reason: SURG

## 2021-01-05 RX ORDER — FAMOTIDINE 10 MG/ML
20 INJECTION, SOLUTION INTRAVENOUS ONCE
Status: DISCONTINUED | OUTPATIENT
Start: 2021-01-05 | End: 2021-01-05 | Stop reason: HOSPADM

## 2021-01-05 RX ORDER — PHENYLEPHRINE HCL IN 0.9% NACL 0.5 MG/5ML
.5-3 SYRINGE (ML) INTRAVENOUS CONTINUOUS PRN
Status: DISCONTINUED | OUTPATIENT
Start: 2021-01-05 | End: 2021-01-05

## 2021-01-05 RX ORDER — CHLORHEXIDINE GLUCONATE 0.12 MG/ML
15 RINSE ORAL EVERY 12 HOURS SCHEDULED
Status: DISCONTINUED | OUTPATIENT
Start: 2021-01-05 | End: 2021-01-05

## 2021-01-05 RX ORDER — POTASSIUM CHLORIDE, DEXTROSE MONOHYDRATE 150; 5 MG/100ML; G/100ML
30 INJECTION, SOLUTION INTRAVENOUS CONTINUOUS
Status: DISCONTINUED | OUTPATIENT
Start: 2021-01-05 | End: 2021-01-05

## 2021-01-05 RX ORDER — NITROGLYCERIN 20 MG/100ML
5-200 INJECTION INTRAVENOUS CONTINUOUS PRN
Status: DISCONTINUED | OUTPATIENT
Start: 2021-01-05 | End: 2021-01-07

## 2021-01-05 RX ORDER — FENTANYL CITRATE 50 UG/ML
25 INJECTION, SOLUTION INTRAMUSCULAR; INTRAVENOUS
Status: DISCONTINUED | OUTPATIENT
Start: 2021-01-05 | End: 2021-01-05

## 2021-01-05 RX ORDER — AMOXICILLIN 250 MG
2 CAPSULE ORAL 2 TIMES DAILY
Status: DISCONTINUED | OUTPATIENT
Start: 2021-01-05 | End: 2021-01-09 | Stop reason: HOSPADM

## 2021-01-05 RX ORDER — MAGNESIUM HYDROXIDE 1200 MG/15ML
LIQUID ORAL AS NEEDED
Status: DISCONTINUED | OUTPATIENT
Start: 2021-01-05 | End: 2021-01-05 | Stop reason: HOSPADM

## 2021-01-05 RX ORDER — DOPAMINE HYDROCHLORIDE 160 MG/100ML
2-20 INJECTION, SOLUTION INTRAVENOUS CONTINUOUS PRN
Status: DISCONTINUED | OUTPATIENT
Start: 2021-01-05 | End: 2021-01-05

## 2021-01-05 RX ORDER — HEPARIN SODIUM 1000 [USP'U]/ML
INJECTION, SOLUTION INTRAVENOUS; SUBCUTANEOUS AS NEEDED
Status: DISCONTINUED | OUTPATIENT
Start: 2021-01-05 | End: 2021-01-05 | Stop reason: SURG

## 2021-01-05 RX ORDER — SODIUM CHLORIDE 0.9 % (FLUSH) 0.9 %
30 SYRINGE (ML) INJECTION ONCE AS NEEDED
Status: DISCONTINUED | OUTPATIENT
Start: 2021-01-05 | End: 2021-01-05

## 2021-01-05 RX ORDER — ONDANSETRON 2 MG/ML
4 INJECTION INTRAMUSCULAR; INTRAVENOUS EVERY 6 HOURS PRN
Status: DISCONTINUED | OUTPATIENT
Start: 2021-01-05 | End: 2021-01-09 | Stop reason: HOSPADM

## 2021-01-05 RX ORDER — SODIUM CHLORIDE 9 MG/ML
INJECTION, SOLUTION INTRAVENOUS AS NEEDED
Status: DISCONTINUED | OUTPATIENT
Start: 2021-01-05 | End: 2021-01-05 | Stop reason: HOSPADM

## 2021-01-05 RX ORDER — MORPHINE SULFATE 2 MG/ML
2 INJECTION, SOLUTION INTRAMUSCULAR; INTRAVENOUS
Status: DISCONTINUED | OUTPATIENT
Start: 2021-01-05 | End: 2021-01-09 | Stop reason: HOSPADM

## 2021-01-05 RX ORDER — ETOMIDATE 2 MG/ML
INJECTION INTRAVENOUS AS NEEDED
Status: DISCONTINUED | OUTPATIENT
Start: 2021-01-05 | End: 2021-01-05 | Stop reason: SURG

## 2021-01-05 RX ORDER — NITROGLYCERIN 0.4 MG/1
0.4 TABLET SUBLINGUAL
Status: DISCONTINUED | OUTPATIENT
Start: 2021-01-05 | End: 2021-01-05 | Stop reason: HOSPADM

## 2021-01-05 RX ORDER — OXYCODONE HYDROCHLORIDE AND ACETAMINOPHEN 5; 325 MG/1; MG/1
2 TABLET ORAL EVERY 4 HOURS PRN
Status: DISCONTINUED | OUTPATIENT
Start: 2021-01-05 | End: 2021-01-09 | Stop reason: HOSPADM

## 2021-01-05 RX ORDER — NITROGLYCERIN 20 MG/100ML
INJECTION INTRAVENOUS
Status: COMPLETED
Start: 2021-01-05 | End: 2021-01-05

## 2021-01-05 RX ORDER — SODIUM CHLORIDE 0.9 % (FLUSH) 0.9 %
10 SYRINGE (ML) INJECTION AS NEEDED
Status: DISCONTINUED | OUTPATIENT
Start: 2021-01-05 | End: 2021-01-05 | Stop reason: HOSPADM

## 2021-01-05 RX ORDER — POTASSIUM CHLORIDE 1.5 G/1.77G
40 POWDER, FOR SOLUTION ORAL AS NEEDED
Status: DISCONTINUED | OUTPATIENT
Start: 2021-01-05 | End: 2021-01-07

## 2021-01-05 RX ORDER — ASPIRIN 325 MG
325 TABLET, DELAYED RELEASE (ENTERIC COATED) ORAL DAILY
Status: DISCONTINUED | OUTPATIENT
Start: 2021-01-06 | End: 2021-01-08

## 2021-01-05 RX ORDER — PAPAVERINE HYDROCHLORIDE 30 MG/ML
INJECTION INTRAMUSCULAR; INTRAVENOUS AS NEEDED
Status: DISCONTINUED | OUTPATIENT
Start: 2021-01-05 | End: 2021-01-05 | Stop reason: HOSPADM

## 2021-01-05 RX ORDER — CHLORHEXIDINE GLUCONATE 500 MG/1
1 CLOTH TOPICAL EVERY 12 HOURS PRN
Status: DISCONTINUED | OUTPATIENT
Start: 2021-01-05 | End: 2021-01-05 | Stop reason: HOSPADM

## 2021-01-05 RX ORDER — DOBUTAMINE HYDROCHLORIDE 100 MG/100ML
2-20 INJECTION INTRAVENOUS CONTINUOUS PRN
Status: DISCONTINUED | OUTPATIENT
Start: 2021-01-05 | End: 2021-01-05

## 2021-01-05 RX ORDER — CALCIUM CHLORIDE 100 MG/ML
INJECTION INTRAVENOUS; INTRAVENTRICULAR AS NEEDED
Status: DISCONTINUED | OUTPATIENT
Start: 2021-01-05 | End: 2021-01-05 | Stop reason: SURG

## 2021-01-05 RX ORDER — SODIUM CHLORIDE 9 MG/ML
30 INJECTION, SOLUTION INTRAVENOUS CONTINUOUS PRN
Status: DISCONTINUED | OUTPATIENT
Start: 2021-01-05 | End: 2021-01-05

## 2021-01-05 RX ORDER — DEXMEDETOMIDINE HYDROCHLORIDE 4 UG/ML
.2-1.5 INJECTION, SOLUTION INTRAVENOUS CONTINUOUS PRN
Status: DISCONTINUED | OUTPATIENT
Start: 2021-01-05 | End: 2021-01-05

## 2021-01-05 RX ADMIN — ESMOLOL HYDROCHLORIDE 10 MG: 10 INJECTION, SOLUTION INTRAVENOUS at 07:30

## 2021-01-05 RX ADMIN — SUFENTANIL CITRATE 100 MCG: 50 INJECTION EPIDURAL; INTRAVENOUS at 07:07

## 2021-01-05 RX ADMIN — HEPARIN SODIUM 30000 UNITS: 1000 INJECTION, SOLUTION INTRAVENOUS; SUBCUTANEOUS at 08:15

## 2021-01-05 RX ADMIN — CHLORHEXIDINE GLUCONATE 0.12% ORAL RINSE 15 ML: 1.2 LIQUID ORAL at 06:15

## 2021-01-05 RX ADMIN — INSULIN HUMAN 1.8 UNITS/HR: 1 INJECTION, SOLUTION INTRAVENOUS at 19:47

## 2021-01-05 RX ADMIN — NITROGLYCERIN 5 MCG/MIN: 20 INJECTION INTRAVENOUS at 11:00

## 2021-01-05 RX ADMIN — ROCURONIUM BROMIDE 70 MG: 10 INJECTION INTRAVENOUS at 07:07

## 2021-01-05 RX ADMIN — ALBUMIN HUMAN 500 ML: 0.05 INJECTION, SOLUTION INTRAVENOUS at 11:25

## 2021-01-05 RX ADMIN — ETOMIDATE 18 MG: 2 INJECTION, SOLUTION INTRAVENOUS at 07:07

## 2021-01-05 RX ADMIN — MORPHINE SULFATE 2 MG: 2 INJECTION, SOLUTION INTRAMUSCULAR; INTRAVENOUS at 16:45

## 2021-01-05 RX ADMIN — MUPIROCIN 1 APPLICATION: 20 OINTMENT TOPICAL at 06:16

## 2021-01-05 RX ADMIN — PROPOFOL 25 MCG/KG/MIN: 10 INJECTION, EMULSION INTRAVENOUS at 10:10

## 2021-01-05 RX ADMIN — METOCLOPRAMIDE 10 MG: 5 INJECTION, SOLUTION INTRAMUSCULAR; INTRAVENOUS at 20:08

## 2021-01-05 RX ADMIN — POTASSIUM CHLORIDE AND DEXTROSE MONOHYDRATE 30 ML/HR: 150; 5 INJECTION, SOLUTION INTRAVENOUS at 11:24

## 2021-01-05 RX ADMIN — ALBUMIN HUMAN 500 ML: 0.05 INJECTION, SOLUTION INTRAVENOUS at 14:34

## 2021-01-05 RX ADMIN — SUFENTANIL CITRATE 100 MCG: 50 INJECTION EPIDURAL; INTRAVENOUS at 09:30

## 2021-01-05 RX ADMIN — GLYCOPYRROLATE 0.2 MG: 0.2 INJECTION INTRAMUSCULAR; INTRAVENOUS at 09:33

## 2021-01-05 RX ADMIN — OXYCODONE AND ACETAMINOPHEN 2 TABLET: 5; 325 TABLET ORAL at 19:47

## 2021-01-05 RX ADMIN — CEFUROXIME 1.5 G: 1.5 INJECTION, POWDER, FOR SOLUTION INTRAVENOUS at 09:47

## 2021-01-05 RX ADMIN — MIDAZOLAM 2 MG: 1 INJECTION INTRAMUSCULAR; INTRAVENOUS at 09:30

## 2021-01-05 RX ADMIN — AMINOCAPROIC ACID 5 G: 250 INJECTION, SOLUTION INTRAVENOUS at 07:30

## 2021-01-05 RX ADMIN — CEFUROXIME SODIUM 1.5 G: 1.5 INJECTION, POWDER, FOR SOLUTION INTRAVENOUS at 18:04

## 2021-01-05 RX ADMIN — CEFUROXIME 1.5 G: 1.5 INJECTION, POWDER, FOR SOLUTION INTRAVENOUS at 07:30

## 2021-01-05 RX ADMIN — LIDOCAINE HYDROCHLORIDE 0.5 ML: 10 INJECTION, SOLUTION EPIDURAL; INFILTRATION; INTRACAUDAL; PERINEURAL at 06:16

## 2021-01-05 RX ADMIN — FAMOTIDINE 20 MG: 20 TABLET, FILM COATED ORAL at 06:15

## 2021-01-05 RX ADMIN — POTASSIUM CHLORIDE 20 MEQ: 29.8 INJECTION, SOLUTION INTRAVENOUS at 13:56

## 2021-01-05 RX ADMIN — ASPIRIN 325 MG ORAL TABLET 325 MG: 325 PILL ORAL at 12:58

## 2021-01-05 RX ADMIN — SUFENTANIL CITRATE 50 MCG: 50 INJECTION EPIDURAL; INTRAVENOUS at 09:39

## 2021-01-05 RX ADMIN — HYDROCODONE BITARTRATE AND ACETAMINOPHEN 1 TABLET: 7.5; 325 TABLET ORAL at 14:53

## 2021-01-05 RX ADMIN — PROTAMINE SULFATE 300 MG: 10 INJECTION, SOLUTION INTRAVENOUS at 09:38

## 2021-01-05 RX ADMIN — SODIUM CHLORIDE, POTASSIUM CHLORIDE, SODIUM LACTATE AND CALCIUM CHLORIDE 9 ML/HR: 600; 310; 30; 20 INJECTION, SOLUTION INTRAVENOUS at 06:16

## 2021-01-05 RX ADMIN — MORPHINE SULFATE 2 MG: 2 INJECTION, SOLUTION INTRAMUSCULAR; INTRAVENOUS at 14:31

## 2021-01-05 RX ADMIN — CALCIUM CHLORIDE 1 G: 100 INJECTION INTRAVENOUS; INTRAVENTRICULAR at 09:38

## 2021-01-05 RX ADMIN — MIDAZOLAM 3 MG: 1 INJECTION INTRAMUSCULAR; INTRAVENOUS at 07:07

## 2021-01-05 RX ADMIN — ONDANSETRON 4 MG: 2 INJECTION INTRAMUSCULAR; INTRAVENOUS at 15:35

## 2021-01-05 RX ADMIN — ALBUMIN HUMAN: 0.05 INJECTION, SOLUTION INTRAVENOUS at 10:10

## 2021-01-05 RX ADMIN — ROCURONIUM BROMIDE 30 MG: 10 INJECTION INTRAVENOUS at 09:30

## 2021-01-05 RX ADMIN — SODIUM CHLORIDE, POTASSIUM CHLORIDE, SODIUM LACTATE AND CALCIUM CHLORIDE: 600; 310; 30; 20 INJECTION, SOLUTION INTRAVENOUS at 07:07

## 2021-01-05 NOTE — ANESTHESIA PREPROCEDURE EVALUATION
Anesthesia Evaluation     Patient summary reviewed and Nursing notes reviewed   history of anesthetic complications: PONV  NPO Solid Status: > 8 hours  NPO Liquid Status: > 8 hours           Airway   Mallampati: II  TM distance: >3 FB  Neck ROM: full  No difficulty expected  Dental      Pulmonary     breath sounds clear to auscultation  Cardiovascular     ECG reviewed  Rhythm: regular  Rate: normal    (+) CAD, angina,       Neuro/Psych  GI/Hepatic/Renal/Endo      Musculoskeletal     (+) neck pain,   Abdominal    Substance History      OB/GYN          Other                        Anesthesia Plan    ASA 4     general   (ROCÍO)  intravenous induction     Anesthetic plan, all risks, benefits, and alternatives have been provided, discussed and informed consent has been obtained with: patient.

## 2021-01-05 NOTE — ANESTHESIA POSTPROCEDURE EVALUATION
Patient: Preeti Guerra    Procedure Summary     Date: 01/05/21 Room / Location:  [facility]  [facility]  [facility]    Anesthesia Start: 0700 Anesthesia Stop: 1048    Procedure: CORONARY ARTERY BYPASS GRAFTING x 2 WITH INTERNAL MAMMARY ARTERY GRAFT, EVH FROM RIGHT GREATER SAPHENOUS VEIN /ROCÍO (N/A Chest) Diagnosis:       Coronary artery disease involving native coronary artery of native heart with angina pectoris (CMS/HCC)      (Coronary artery disease involving native coronary artery of native heart with angina pectoris (CMS/HCC) [I25.119])    Surgeon: Ion Moody MD Provider: Jitendra Simpson MD    Anesthesia Type: general ASA Status: 4          Anesthesia Type: general    Vitals  Vitals Value Taken Time   BP     Temp     Pulse 79 01/05/21 1047   Resp     SpO2 100 % 01/05/21 1047   Vitals shown include unvalidated device data.        Post Anesthesia Care and Evaluation    Patient location during evaluation: ICU  Patient participation: complete - patient cannot participate  Level of consciousness: obtunded/minimal responses  Airway patency: patent    Cardiovascular status: acceptable  Respiratory status: acceptable, ETT and intubated  Hydration status: acceptable

## 2021-01-05 NOTE — ANESTHESIA PROCEDURE NOTES
Central Line      Patient reassessed immediately prior to procedure    Patient location during procedure: OR  Indications: vascular access  Preanesthetic Checklist  Completed: patient identified, site marked, surgical consent, pre-op evaluation, timeout performed, IV checked, risks and benefits discussed and monitors and equipment checked  Central Line Prep  Sterile Tech:cap, gloves, gown, mask and sterile barriers  Prep: chloraprep  Patient monitoring: blood pressure monitoring, continuous pulse oximetry and EKG  Central Line Procedure  Laterality:right  Location:internal jugular  Catheter Type:Cordis and [place]  Catheter Size:9 Fr  Guidance:ultrasound guided  PROCEDURE NOTE/ULTRASOUND INTERPRETATION.  Using ultrasound guidance the potential vascular sites for insertion of the catheter were visualized to determine the patency of the vessel to be used for vascular access.  After selecting the appropriate site for insertion, the needle was visualized under ultrasound being inserted into the internal jugular vein, followed by ultrasound confirmation of wire and catheter placement. There were no abnormalities seen on ultrasound; an image was taken; and the patient tolerated the procedure with no complications. Images: still images obtained, printed/placed on chart  Assessment  Post procedure:biopatch applied, line sutured, occlusive dressing applied and secured with tape  Assessement:blood return through all ports, free fluid flow, chest x-ray ordered and Wayne Test  Complications:no  Patient Tolerance:patient tolerated the procedure well with no apparent complications
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
## 2021-01-05 NOTE — INTERVAL H&P NOTE
[facility] Pre-op    Full history and physical note from office is attached.    /75 (BP Location: Left arm, Patient Position: Lying)   Pulse 88   Temp 97.6 °F (36.4 °C) (Temporal)   Resp 18   LMP  (LMP Unknown)   SpO2 98%       LAB Results:  Lab Results   Component Value Date    WBC 6.43 01/04/2021    HGB 14.3 01/04/2021    HCT 44.2 01/04/2021    MCV 91.9 01/04/2021     01/04/2021    NEUTROABS 4.57 01/04/2021    GLUCOSE 115 (H) 01/04/2021    BUN 22 01/04/2021    CREATININE 0.85 01/04/2021    EGFRIFNONA 67 01/04/2021     01/04/2021    K 3.6 01/04/2021     01/04/2021    CO2 29.0 01/04/2021    MG 3.3 (H) 01/04/2021    CALCIUM 9.9 01/04/2021    ALBUMIN 4.60 01/04/2021    AST 20 01/04/2021    ALT 16 01/04/2021    BILITOT 0.5 01/04/2021    PTT 37.0 01/04/2021    INR 0.98 01/04/2021       Cancer Staging (if applicable)  Cancer Patient: __ yes __no __unknown__N/A; If yes, clinical stage T:__ N:__M:__, stage group or __N/A      Impression: Coronary artery disease involving native coronary artery of native heart with angina pectoris (CMS/HCC)      Plan: CORONARY ARTERY BYPASS GRAFTING WITH EVH/ROCÍO      Soumya Ladd, LAUREN   1/5/2021   06:37 EST

## 2021-01-05 NOTE — ANESTHESIA PROCEDURE NOTES
Procedure Performed: Emergent/Open-Heart Anesthesia ROCÍO     Start Time:        End Time:      Preanesthesia Checklist:  Patient identified, IV assessed, risks and benefits discussed, monitors and equipment assessed, procedure being performed at surgeon's request and anesthesia consent obtained.    General Procedure Information  Diagnostic Indications for Echo:  assessment of ascending aorta, assessment of surgical repair and hemodynamic monitoring  Physician Requesting Echo: Ion Moody MD  Location performed:  OR  Intubated  Bite block not placed  Heart visualized  Probe Insertion:  Easy  Probe Type:  Multiplane  Modalities:  Color flow mapping, continuous wave Doppler and pulse wave Doppler    Echocardiographic and Doppler Measurements    Ventricles    Right Ventricle:  Hypertrophy not present.  Thrombus not present.  Global function normal.    Left Ventricle:  Cavity size normal.  Hypertrophy not present.  Thrombus not present.  Global Function normal.  Ejection Fraction 55%.          Valves    Aortic Valve:  Annulus normal.  Stenosis not present.  Area: 2.7 cm².  Mean Gradient: 2 mean 1 mmHg.  Pressure Half-time: 833 ms.  Regurgitation mild.  Leaflets normal.  Leaflet motions normal.      Mitral Valve:  Annulus normal.  Stenosis not present.  Area: 2.8 cm².  Mean Gradient: 1 mmHg.  Vena Contracta Width: 0.18 cm.  Regurgitation mild.  Leaflets normal.  Leaflet motions normal.      Tricuspid Valve:  Annulus normal.  Stenosis not present.  Regurgitation trace.  Leaflets normal.  Leaflet motions normal.    Pulmonic Valve:  Annulus normal.  Regurgitation trace.          Aorta    Ascending Aorta:  Size normal.  Diameter 2.8 cm.  Dissection not present.  Plaque thickness less than 3 mm.  Mobile plaque not present.    Aortic Arch:  Size normal.  Dissection not present.  Plaque thickness less than 3 mm.  Mobile plaque not present.    Descending Aorta:  Size normal.  Dissection not present.  Plaque thickness less  than 3 mm.  Mobile plaque not present.          Atria    Right Atrium:  Size normal.  Spontaneous echo contrast not present.  Thrombus not present.  Tumor not present.  Device present.    Left Atrium:  Size normal.  Spontaneous echo contrast not present.  Thrombus not present.  Tumor not present.  Left atrial appendage normal.      Septa    Atrial Septum:  Intra-atrial septal morphology normal.      Ventricular Septum:  Intra-ventricular septum morphology normal.      Diastolic Function Measurements:  Diastolic Dysfunction Grade=  E= ms  A= ms  E/A Ratio= 0.7  DT= ms  S/D= 1.6  IVRT=    Other Findings  Pericardium:  normal  Pleural Effusion:  none  Pulmonary Arteries:  normal  Pulmonary Venous Flow:  normal    Anesthesia Information  Performed Personally  Anesthesiologist:  Jitendra Simpson MD      Echocardiogram Comments:       Informed consent was obtained preoperatively.  Tomas probe passed without difficulty.  Post CABG:

## 2021-01-05 NOTE — CONSULTS
[facility] Cardiology Daily Note       LOS: 0 days   Patient Care Team:  Elaine Marcos MD as PCP - General  Elaine Marcos MD as PCP - Family Medicine  RomeroFatoumata oneil MD as Consulting Physician (Cardiology)    Chief Complaint: Hyperlipidemia; CAD    Subjective     Subjective: Intubated and preparing for extubation.  Day of surgery status post two-vessel CABG today with Dr. Moody for left main disease.  Currently on norepinephrine at 0.01 mcg/kg/min.      Review of Systems:   As above.    Medications:  albumin human, , ,   [START ON 1/6/2021] aspirin, 325 mg, Oral, Daily  aspirin, 325 mg, Oral, Once  atorvastatin, 40 mg, Oral, Nightly  cefuroxime, 1.5 g, Intravenous, Q8H  chlorhexidine, 15 mL, Mouth/Throat, Q12H  [START ON 1/6/2021] metoprolol tartrate, 12.5 mg, Oral, Q12H  metoprolol tartrate, 2.5 mg, Intravenous, Q6H  mupirocin, 1 application, Each Nare, Q12H  pharmacy consult - [place], , Does not apply, Daily  protamine, 50 mg, Intravenous, Once        Objective     Vital Sign Min/Max for last 24 hours  Temp  Min: 95.7 °F (35.4 °C)  Max: 97.6 °F (36.4 °C)   BP  Min: 113/69  Max: 130/75   Pulse  Min: 78  Max: 88   Resp  Min: 16  Max: 18   SpO2  Min: 98 %  Max: 100 %   No data recorded   Weight  Min: 71.1 kg (156 lb 12.8 oz)  Max: 71.1 kg (156 lb 12.8 oz)      Intake/Output Summary (Last 24 hours) at 1/5/2021 1141  Last data filed at 1/5/2021 1055  Gross per 24 hour   Intake 1500 ml   Output 348 ml   Net 1152 ml            Physical Exam:    General: Awake but intubated  Cardiovascular: Heart has a nondisplaced focal PMI. Regular rate and rhythm without murmur, gallop or rub.  Lungs: Clear without rales or wheezes. Equal expansion is noted.   Extremities: Show no edema.  Skin: warm and dry.  Neurologic: nonfocal       Results Review:    I reviewed the patient's new clinical results.  EKG: Normal sinus rhythm, no new/significant ST segment change  Tele: Normal sinus  rhythm    Labs:    Results from last 7 days   Lab Units 01/04/21  1515   SODIUM mmol/L 139   POTASSIUM mmol/L 3.6   CHLORIDE mmol/L 100   CO2 mmol/L 29.0   BUN mg/dL 22   CREATININE mg/dL 0.85   CALCIUM mg/dL 9.9   BILIRUBIN mg/dL 0.5   ALK PHOS U/L 106   ALT (SGPT) U/L 16   AST (SGOT) U/L 20   GLUCOSE mg/dL 115*     Results from last 7 days   Lab Units 01/05/21  1103 01/05/21  1016 01/04/21  1515   WBC 10*3/mm3 6.13 5.36 6.43   HEMOGLOBIN g/dL 9.0* 8.7* 14.3   HEMATOCRIT % 27.4* 26.8* 44.2   PLATELETS 10*3/mm3 116* 119* 265     No results found for: TROPONINI, TROPONINT  Lab Results   Component Value Date    CHOL 227 (H) 12/18/2020     Lab Results   Component Value Date    TRIG 156 (H) 12/18/2020     Lab Results   Component Value Date    HDL 48 12/18/2020     No components found for: LDLCALC  Lab Results   Component Value Date    INR 1.61 (H) 01/05/2021    INR 0.98 01/04/2021    PROTIME 18.9 (H) 01/05/2021    PROTIME 12.7 01/04/2021         Ejection Fraction: 60%    Assessment   Assessment:    PROBLEM LIST:  1. Coronary artery disease  - C 12/28/2020: significant left main disease by IFR, non critical disease in LAD, circumflex and RCA.   - Echo 12/18/2020: EF 60%, mild AR, trace to mild MR.  - s/p 2V CABG (LIMA to LAD, SVG to OM1), Dr. Moody, 1/5/2020.     2. Hyperlipidemia    Plan:    Continue aspirin and statin for coronary disease  Wean norepinephrine as tolerated  Start metoprolol as heart rate and blood pressure allow  Extubation is planned in just a few minutes  Continue telemetry monitoring for postoperative arrhythmias      Electronically signed by LAUREN Marion, 01/05/21, 11:46 AM EST.    I have seen and examined the patient, case was discussed with the physician extender, reviewed the above note, necessary changes were made and I agree with the final note.  Fatoumata Jasso MD, [facility]

## 2021-01-05 NOTE — ANESTHESIA PROCEDURE NOTES
Airway  Urgency: elective    Date/Time: 1/5/2021 7:09 AM  Airway not difficult    General Information and Staff    Patient location during procedure: OR    Indications and Patient Condition  Indications for airway management: airway protection    Preoxygenated: yes  MILS not maintained throughout  Mask difficulty assessment: 1 - vent by mask    Final Airway Details  Final airway type: endotracheal airway      Successful airway: ETT  Cuffed: yes   Successful intubation technique: direct laryngoscopy  Endotracheal tube insertion site: oral  Blade: Dalia  Blade size: 3  ETT size (mm): 7.0  Cormack-Lehane Classification: grade I - full view of glottis  Placement verified by: chest auscultation and capnometry   Measured from: lips  ETT/EBT  to lips (cm): 20  Number of attempts at approach: 1  Assessment: lips, teeth, and gum same as pre-op and atraumatic intubation    Additional Comments  Negative epigastric sounds, Breath sound equal bilaterally with symmetric chest rise and fall

## 2021-01-05 NOTE — OP NOTE
DATE OF PROCEDURE: January 5, 2021     PREOPERATIVE DIAGNOSES:  1.  Left main coronary artery disease  2.  Abnormal stress test  3.  History of hemorrhagic cystitis   4.  Hyperlipidemia    POSTOPERATIVE DIAGNOSES:    Same     PROCEDURES PERFORMED:    1. Coronary artery bypass graft x 2  2. Endoscopic vein harvesting (right greater saphenous vein).       SURGEON: Ion Moody MD       ASSISTANTS:    Assistant: Nabil Soriano PA, Jonathan Little MD  was responsible for performing the following activities: Retraction, Suction, Irrigation, Suturing, Closing, Placing Dressing, Harvesting of Vessels and Bypass Grafting and their skilled assistance was necessary for the success of this case.    Circulator: Maine Lopez RN; Gerardo Norris RN  Perfusionist: Amparo Land  Scrub Person: Paulina Nevarez  Nursing Assistant: Clemente Parker  Assistant: Nabil Soriano PA     ANESTHESIA: General endotracheal anesthesia     ESTIMATED BLOOD LOSS: 300 mL.       CROSSCLAMP TIME: 49 minutes.       TOTAL CARDIOPULMONARY BYPASS TIME: 58 minutes.       DISTAL BYPASS TARGETS:    1. LIMA to LAD.    2. Greater saphenous vein to first obtuse marginal      INDICATIONS: Left main coronary artery disease.  The patient was felt to be a reasonable candidate for surgical revascularization. The risks and benefits of surgery were discussed with the patient including pain, bleeding, infection, renal failure, stroke and death. The patient understood these risks and wished to proceed with surgery.      DESCRIPTION OF PROCEDURE: The patient was taken to the operating room and placed under general endotracheal anesthesia. A central line, [place]-Brock catheter, radial arterial line, and Collins catheter were placed. The patient was prepped and draped in the usual sterile fashion and a timeout was performed, including the patient's name, procedure, consent, beta blockade administration, and antibiotics were verified.    The right greater saphenous vein  was harvested from the groin to the knee using EVH technique. Subcutaneous tissues were closed with a running 3-0 Vicryl suture and 4-0 Monocryl subcuticular stitch. Simultaneously, a median sternotomy incision was made and electrocautery was utilized to gain access to the sternum. A midline sternotomy was performed after lung desufflation and hemostasis was achieved with electrocautery.    Attention was turned to the left internal mammary artery, which was taken down using electrocautery and small clips. Dissection was performed proximally to the subclavian vein and distally to the bifurcation. The bifurcation was ligated with 2 large clips to each branch and sharply divided. This revealed excellent flow within the mammary artery which was ligated distally using small clips and irrigated with papaverine solution and placed in a soaked Ray-Kerline sponge in the left pleural space. The pericardium was opened and stay sutures were placed to create a pericardial well. Next, 2-0 Ethibond sutures were placed in the ascending aorta and systemic heparin was administered. Additional cannulation sutures were placed in the right atrial appendage, ascending aorta. After verification of satisfactory activated clotting time, the arterial cannula was placed and connected to the cardiopulmonary bypass circuit after being de-aired. The line was tested and a wrap was performed. The venous cannula was inserted followed by antegrade cardioplegia lines. The vein was inspected and found to be of appropriate caliber and quality for bypass grafting. A slit within the pericardial well along the cephalad portion was created for appropriate transition of the mammary pedicle into the mediastinum. Cardiopulmonary bypass was initiated and the patient was allowed to drift in temperature and distal bypass targets were verified. Bypass flow was dropped and the aortic crossclamp was applied. Cardioplegia was administered in an antegrade fashion with  immediate cessation of cardiac activity.    The first obtuse marginal was approximately 2.25 mm in diameter and an arteriotomy was made on the proximal portion of this vessel and extended proximally and distally. An end-to-side anastomosis was performed with running 7-0 Prolene suture and tied down. Cardioplegia was given down the anastomosis via hand injection with no evidence of leak and good blood flow. Verification of vein length was obtained by filling the heart and the vein graft was trimmed to the appropriate length.  The LAD was inspected and found to have relatively free of disease. An arteriotomy was made on the mid LAD and extended proximally and distally on this 1.75 mm diameter vessel. The internal mammary artery was then prepared for grafting by ligating the 2 venous branches along the pedicle using small clips. The mammary artery was trimmed proximal to the bifurcation and beveled for grafting. An end-to-side anastomosis with an 7-0 Prolene suture was constructed and tied down. The bulldog clamp was removed and there was excellent flow within the vessel and adequate filling of the LAD. The underlying mammary fascia was secured to the epicardium using two 6-0 Prolene sutures.  1 aortotomy was then made on the proximal ascending aorta and end-to-side proximal anastomoses were carried out using 6-0 Prolene suture and labeled with a radiopaque washers. A hot shot was given down the ascending aorta after bulldog clamp was applied to the vein graft. The veins were de-aired and the patient was placed in steep Trendelenburg position. The root vent was turned on high suction and cardiopulmonary bypass flow was turned down with crossclamp subsequently removed. Bypass flows were returned to normal and the bulldog clamps were removed. The heart returned to spontaneous sinus rhythm without fibrillation. The proximal and distal anastomoses were then inspected and found to be hemostatic.   The patient was  subsequently weaned from cardiopulmonary bypass and decannulation was successfully carried out. All cannulation sites were reinforced with additional 4-0 Prolene suture and inspected for hemostasis. Doppler examination of bypass grafts revealed excellent flow within the mammary and vein grafts.  Ventricular pacing wires were placed and secured using 0 silk suture. Two chest tubes were then placed within the left pleural space and mediastinum. These were secured using a 0 Ethibond suture. The sternum was reapproximated with #6 stainless steel wire and the linea alba was closed with a running 0 Vicryl suture. Subcutaneous tissues were closed with a 2-0 Vicryl suture and the inferior aspect of the incision was closed with additional interrupted 2-0 Vicryl sutures in the dermal layer. The skin was reapproximated with a 4-0 Monocryl subcuticular stitch and overlying skin glue was applied to the incision. Gauze and tape were applied to the chest tube sites and the patient was subsequently transported to the cardiac ICU in stable condition, intubated.  At the end of the case all sponge and needle counts were correct.

## 2021-01-06 ENCOUNTER — APPOINTMENT (OUTPATIENT)
Dept: GENERAL RADIOLOGY | Facility: HOSPITAL | Age: 66
End: 2021-01-06

## 2021-01-06 LAB
ALBUMIN SERPL-MCNC: 4.5 G/DL (ref 3.5–5.2)
ANION GAP SERPL CALCULATED.3IONS-SCNC: 9 MMOL/L (ref 5–15)
BASE EXCESS BLDA CALC-SCNC: 0 MMOL/L (ref -5–5)
BASE EXCESS BLDA CALC-SCNC: 2 MMOL/L (ref -5–5)
BASE EXCESS BLDA CALC-SCNC: 2 MMOL/L (ref -5–5)
BASE EXCESS BLDA CALC-SCNC: 3 MMOL/L (ref -5–5)
BASE EXCESS BLDA CALC-SCNC: 4 MMOL/L (ref -5–5)
BASE EXCESS BLDA CALC-SCNC: 4 MMOL/L (ref -5–5)
BUN SERPL-MCNC: 12 MG/DL (ref 8–23)
BUN/CREAT SERPL: 15 (ref 7–25)
CA-I BLDA-SCNC: 0.87 MMOL/L (ref 1.2–1.32)
CA-I BLDA-SCNC: 0.87 MMOL/L (ref 1.2–1.32)
CA-I BLDA-SCNC: 0.95 MMOL/L (ref 1.2–1.32)
CA-I BLDA-SCNC: 1.15 MMOL/L (ref 1.2–1.32)
CA-I BLDA-SCNC: 1.24 MMOL/L (ref 1.2–1.32)
CA-I BLDA-SCNC: 1.71 MMOL/L (ref 1.2–1.32)
CALCIUM SPEC-SCNC: 8.5 MG/DL (ref 8.6–10.5)
CHLORIDE SERPL-SCNC: 102 MMOL/L (ref 98–107)
CO2 BLDA-SCNC: 25 MMOL/L (ref 24–29)
CO2 BLDA-SCNC: 27 MMOL/L (ref 24–29)
CO2 BLDA-SCNC: 27 MMOL/L (ref 24–29)
CO2 BLDA-SCNC: 28 MMOL/L (ref 24–29)
CO2 BLDA-SCNC: 28 MMOL/L (ref 24–29)
CO2 BLDA-SCNC: 29 MMOL/L (ref 24–29)
CO2 SERPL-SCNC: 28 MMOL/L (ref 22–29)
CREAT SERPL-MCNC: 0.8 MG/DL (ref 0.57–1)
DEPRECATED RDW RBC AUTO: 49.1 FL (ref 37–54)
ERYTHROCYTE [DISTWIDTH] IN BLOOD BY AUTOMATED COUNT: 14.3 % (ref 12.3–15.4)
GFR SERPL CREATININE-BSD FRML MDRD: 72 ML/MIN/1.73
GLUCOSE BLDC GLUCOMTR-MCNC: 102 MG/DL (ref 70–130)
GLUCOSE BLDC GLUCOMTR-MCNC: 103 MG/DL (ref 70–130)
GLUCOSE BLDC GLUCOMTR-MCNC: 107 MG/DL (ref 70–130)
GLUCOSE BLDC GLUCOMTR-MCNC: 108 MG/DL (ref 70–130)
GLUCOSE BLDC GLUCOMTR-MCNC: 112 MG/DL (ref 70–130)
GLUCOSE BLDC GLUCOMTR-MCNC: 114 MG/DL (ref 70–130)
GLUCOSE BLDC GLUCOMTR-MCNC: 117 MG/DL (ref 70–130)
GLUCOSE BLDC GLUCOMTR-MCNC: 119 MG/DL (ref 70–130)
GLUCOSE BLDC GLUCOMTR-MCNC: 122 MG/DL (ref 70–130)
GLUCOSE BLDC GLUCOMTR-MCNC: 130 MG/DL (ref 70–130)
GLUCOSE BLDC GLUCOMTR-MCNC: 134 MG/DL (ref 70–130)
GLUCOSE BLDC GLUCOMTR-MCNC: 135 MG/DL (ref 70–130)
GLUCOSE BLDC GLUCOMTR-MCNC: 87 MG/DL (ref 70–130)
GLUCOSE BLDC GLUCOMTR-MCNC: 88 MG/DL (ref 70–130)
GLUCOSE BLDC GLUCOMTR-MCNC: 96 MG/DL (ref 70–130)
GLUCOSE BLDC GLUCOMTR-MCNC: 97 MG/DL (ref 70–130)
GLUCOSE SERPL-MCNC: 116 MG/DL (ref 65–99)
HCO3 BLDA-SCNC: 23.6 MMOL/L (ref 22–26)
HCO3 BLDA-SCNC: 25.5 MMOL/L (ref 22–26)
HCO3 BLDA-SCNC: 25.9 MMOL/L (ref 22–26)
HCO3 BLDA-SCNC: 27 MMOL/L (ref 22–26)
HCO3 BLDA-SCNC: 27.3 MMOL/L (ref 22–26)
HCO3 BLDA-SCNC: 28 MMOL/L (ref 22–26)
HCT VFR BLD AUTO: 27.5 % (ref 34–46.6)
HCT VFR BLD AUTO: 28.4 % (ref 34–46.6)
HCT VFR BLDA CALC: 24 % (ref 38–51)
HCT VFR BLDA CALC: 25 % (ref 38–51)
HCT VFR BLDA CALC: 32 % (ref 38–51)
HCT VFR BLDA CALC: 37 % (ref 38–51)
HGB BLD-MCNC: 8.8 G/DL (ref 12–15.9)
HGB BLD-MCNC: 9 G/DL (ref 12–15.9)
HGB BLDA-MCNC: 10.9 G/DL (ref 12–17)
HGB BLDA-MCNC: 12.6 G/DL (ref 12–17)
HGB BLDA-MCNC: 8.2 G/DL (ref 12–17)
HGB BLDA-MCNC: 8.5 G/DL (ref 12–17)
INR PPP: 1.47 (ref 0.85–1.16)
MAGNESIUM SERPL-MCNC: 1.7 MG/DL (ref 1.6–2.4)
MCH RBC QN AUTO: 30 PG (ref 26.6–33)
MCHC RBC AUTO-ENTMCNC: 32 G/DL (ref 31.5–35.7)
MCV RBC AUTO: 93.9 FL (ref 79–97)
PCO2 BLDA: 33 MM HG (ref 35–45)
PCO2 BLDA: 34.6 MM HG (ref 35–45)
PCO2 BLDA: 35.5 MM HG (ref 35–45)
PCO2 BLDA: 38.3 MM HG (ref 35–45)
PCO2 BLDA: 38.4 MM HG (ref 35–45)
PCO2 BLDA: 41.2 MM HG (ref 35–45)
PH BLDA: 7.44 PH UNITS (ref 7.35–7.6)
PH BLDA: 7.44 PH UNITS (ref 7.35–7.6)
PH BLDA: 7.46 PH UNITS (ref 7.35–7.6)
PH BLDA: 7.5 PH UNITS (ref 7.35–7.6)
PHOSPHATE SERPL-MCNC: 3.7 MG/DL (ref 2.5–4.5)
PLATELET # BLD AUTO: 166 10*3/MM3 (ref 140–450)
PMV BLD AUTO: 10.4 FL (ref 6–12)
PO2 BLDA: 362 MMHG (ref 80–105)
PO2 BLDA: 377 MMHG (ref 80–105)
PO2 BLDA: 40 MMHG (ref 80–105)
PO2 BLDA: 452 MMHG (ref 80–105)
PO2 BLDA: 482 MMHG (ref 80–105)
PO2 BLDA: 87 MMHG (ref 80–105)
POTASSIUM BLDA-SCNC: 3.2 MMOL/L (ref 3.5–4.9)
POTASSIUM BLDA-SCNC: 3.2 MMOL/L (ref 3.5–4.9)
POTASSIUM BLDA-SCNC: 3.9 MMOL/L (ref 3.5–4.9)
POTASSIUM BLDA-SCNC: 4.6 MMOL/L (ref 3.5–4.9)
POTASSIUM BLDA-SCNC: 4.7 MMOL/L (ref 3.5–4.9)
POTASSIUM BLDA-SCNC: 4.9 MMOL/L (ref 3.5–4.9)
POTASSIUM SERPL-SCNC: 3.8 MMOL/L (ref 3.5–5.2)
POTASSIUM SERPL-SCNC: 3.9 MMOL/L (ref 3.5–5.2)
PROTHROMBIN TIME: 17.5 SECONDS (ref 11.5–14)
QT INTERVAL: 366 MS
QTC INTERVAL: 467 MS
RBC # BLD AUTO: 2.93 10*6/MM3 (ref 3.77–5.28)
SAO2 % BLDA: 100 % (ref 95–98)
SAO2 % BLDA: 78 % (ref 95–98)
SAO2 % BLDA: 97 % (ref 95–98)
SODIUM BLD-SCNC: 135 MMOL/L (ref 138–146)
SODIUM BLD-SCNC: 136 MMOL/L (ref 138–146)
SODIUM BLD-SCNC: 137 MMOL/L (ref 138–146)
SODIUM BLD-SCNC: 138 MMOL/L (ref 138–146)
SODIUM BLD-SCNC: 139 MMOL/L (ref 138–146)
SODIUM BLD-SCNC: 140 MMOL/L (ref 138–146)
SODIUM SERPL-SCNC: 139 MMOL/L (ref 136–145)
WBC # BLD AUTO: 11.3 10*3/MM3 (ref 3.4–10.8)

## 2021-01-06 PROCEDURE — 99024 POSTOP FOLLOW-UP VISIT: CPT | Performed by: PHYSICIAN ASSISTANT

## 2021-01-06 PROCEDURE — 93005 ELECTROCARDIOGRAM TRACING: CPT | Performed by: PHYSICIAN ASSISTANT

## 2021-01-06 PROCEDURE — 85610 PROTHROMBIN TIME: CPT | Performed by: PHYSICIAN ASSISTANT

## 2021-01-06 PROCEDURE — 25010000003 MAGNESIUM SULFATE 4 GM/100ML SOLUTION: Performed by: STUDENT IN AN ORGANIZED HEALTH CARE EDUCATION/TRAINING PROGRAM

## 2021-01-06 PROCEDURE — 80069 RENAL FUNCTION PANEL: CPT | Performed by: PHYSICIAN ASSISTANT

## 2021-01-06 PROCEDURE — 97162 PT EVAL MOD COMPLEX 30 MIN: CPT

## 2021-01-06 PROCEDURE — 25010000003 CEFUROXIME SODIUM 1.5 G RECONSTITUTED SOLUTION: Performed by: PHYSICIAN ASSISTANT

## 2021-01-06 PROCEDURE — 25010000002 PROCHLORPERAZINE 10 MG/2ML SOLUTION: Performed by: INTERNAL MEDICINE

## 2021-01-06 PROCEDURE — 25010000002 ONDANSETRON PER 1 MG: Performed by: PHYSICIAN ASSISTANT

## 2021-01-06 PROCEDURE — 82962 GLUCOSE BLOOD TEST: CPT

## 2021-01-06 PROCEDURE — 85027 COMPLETE CBC AUTOMATED: CPT | Performed by: STUDENT IN AN ORGANIZED HEALTH CARE EDUCATION/TRAINING PROGRAM

## 2021-01-06 PROCEDURE — 83735 ASSAY OF MAGNESIUM: CPT | Performed by: PHYSICIAN ASSISTANT

## 2021-01-06 PROCEDURE — P9041 ALBUMIN (HUMAN),5%, 50ML: HCPCS | Performed by: NURSE PRACTITIONER

## 2021-01-06 PROCEDURE — 25010000002 MORPHINE PER 10 MG: Performed by: STUDENT IN AN ORGANIZED HEALTH CARE EDUCATION/TRAINING PROGRAM

## 2021-01-06 PROCEDURE — 25010000002 ALBUMIN HUMAN 5% PER 50 ML: Performed by: NURSE PRACTITIONER

## 2021-01-06 PROCEDURE — 99233 SBSQ HOSP IP/OBS HIGH 50: CPT | Performed by: INTERNAL MEDICINE

## 2021-01-06 PROCEDURE — 71045 X-RAY EXAM CHEST 1 VIEW: CPT

## 2021-01-06 PROCEDURE — 99232 SBSQ HOSP IP/OBS MODERATE 35: CPT | Performed by: INTERNAL MEDICINE

## 2021-01-06 RX ORDER — NICOTINE POLACRILEX 4 MG
15 LOZENGE BUCCAL
Status: DISCONTINUED | OUTPATIENT
Start: 2021-01-06 | End: 2021-01-09 | Stop reason: HOSPADM

## 2021-01-06 RX ORDER — MAGNESIUM SULFATE HEPTAHYDRATE 40 MG/ML
2 INJECTION, SOLUTION INTRAVENOUS AS NEEDED
Status: DISCONTINUED | OUTPATIENT
Start: 2021-01-06 | End: 2021-01-07

## 2021-01-06 RX ORDER — ALBUMIN, HUMAN INJ 5% 5 %
500 SOLUTION INTRAVENOUS ONCE
Status: COMPLETED | OUTPATIENT
Start: 2021-01-06 | End: 2021-01-06

## 2021-01-06 RX ORDER — MIDODRINE HYDROCHLORIDE 5 MG/1
10 TABLET ORAL EVERY 8 HOURS SCHEDULED
Status: DISCONTINUED | OUTPATIENT
Start: 2021-01-06 | End: 2021-01-09 | Stop reason: HOSPADM

## 2021-01-06 RX ORDER — MAGNESIUM SULFATE HEPTAHYDRATE 40 MG/ML
4 INJECTION, SOLUTION INTRAVENOUS AS NEEDED
Status: DISCONTINUED | OUTPATIENT
Start: 2021-01-06 | End: 2021-01-07

## 2021-01-06 RX ORDER — DEXTROSE MONOHYDRATE 25 G/50ML
25 INJECTION, SOLUTION INTRAVENOUS
Status: DISCONTINUED | OUTPATIENT
Start: 2021-01-06 | End: 2021-01-09 | Stop reason: HOSPADM

## 2021-01-06 RX ORDER — LEVOTHYROXINE SODIUM 0.03 MG/1
25 TABLET ORAL
Status: DISCONTINUED | OUTPATIENT
Start: 2021-01-06 | End: 2021-01-09 | Stop reason: HOSPADM

## 2021-01-06 RX ORDER — PROCHLORPERAZINE EDISYLATE 5 MG/ML
10 INJECTION INTRAMUSCULAR; INTRAVENOUS EVERY 6 HOURS PRN
Status: DISCONTINUED | OUTPATIENT
Start: 2021-01-06 | End: 2021-01-09 | Stop reason: HOSPADM

## 2021-01-06 RX ADMIN — CEFUROXIME SODIUM 1.5 G: 1.5 INJECTION, POWDER, FOR SOLUTION INTRAVENOUS at 17:42

## 2021-01-06 RX ADMIN — OXYCODONE AND ACETAMINOPHEN 2 TABLET: 5; 325 TABLET ORAL at 22:06

## 2021-01-06 RX ADMIN — MIDODRINE HYDROCHLORIDE 10 MG: 5 TABLET ORAL at 16:03

## 2021-01-06 RX ADMIN — HYDROCODONE BITARTRATE AND ACETAMINOPHEN 1 TABLET: 7.5; 325 TABLET ORAL at 11:07

## 2021-01-06 RX ADMIN — MIDODRINE HYDROCHLORIDE 10 MG: 5 TABLET ORAL at 22:06

## 2021-01-06 RX ADMIN — MORPHINE SULFATE 2 MG: 2 INJECTION, SOLUTION INTRAMUSCULAR; INTRAVENOUS at 17:42

## 2021-01-06 RX ADMIN — MIDODRINE HYDROCHLORIDE 10 MG: 5 TABLET ORAL at 11:07

## 2021-01-06 RX ADMIN — ONDANSETRON 4 MG: 2 INJECTION INTRAMUSCULAR; INTRAVENOUS at 20:02

## 2021-01-06 RX ADMIN — PROCHLORPERAZINE EDISYLATE 10 MG: 5 INJECTION INTRAMUSCULAR; INTRAVENOUS at 08:39

## 2021-01-06 RX ADMIN — DOCUSATE SODIUM 50MG AND SENNOSIDES 8.6MG 2 TABLET: 8.6; 5 TABLET, FILM COATED ORAL at 22:06

## 2021-01-06 RX ADMIN — HYDROCODONE BITARTRATE AND ACETAMINOPHEN 1 TABLET: 7.5; 325 TABLET ORAL at 16:03

## 2021-01-06 RX ADMIN — CEFUROXIME SODIUM 1.5 G: 1.5 INJECTION, POWDER, FOR SOLUTION INTRAVENOUS at 01:29

## 2021-01-06 RX ADMIN — CEFUROXIME SODIUM 1.5 G: 1.5 INJECTION, POWDER, FOR SOLUTION INTRAVENOUS at 11:07

## 2021-01-06 RX ADMIN — MAGNESIUM SULFATE HEPTAHYDRATE 4 G: 40 INJECTION, SOLUTION INTRAVENOUS at 05:40

## 2021-01-06 RX ADMIN — ALBUMIN HUMAN 500 ML: 0.05 INJECTION, SOLUTION INTRAVENOUS at 02:29

## 2021-01-06 RX ADMIN — ATORVASTATIN CALCIUM 40 MG: 40 TABLET, FILM COATED ORAL at 22:06

## 2021-01-06 RX ADMIN — OXYCODONE AND ACETAMINOPHEN 2 TABLET: 5; 325 TABLET ORAL at 03:47

## 2021-01-06 RX ADMIN — ONDANSETRON 4 MG: 2 INJECTION INTRAMUSCULAR; INTRAVENOUS at 03:47

## 2021-01-06 NOTE — PROGRESS NOTES
Multidisciplinary Rounds    Time: 20min  Patient Name: Preeti Guerra  Date of Encounter: 01/06/21 10:55 EST  MRN: [id number]  Admission date: 1/5/2021      Reason for visit: MDR. RD to continue to follow per protocol.     Additional information obtained during MDR: Pt s/p CABG x2 yesterday (1/5). RN reports that pt has been having persistent nausea this AM, zofran and compazine given. Note that pt has celiac disease and will need a gluten free diet order when diet is advanced.     Current diet: Diet Clear Liquid; Gluten Free      Intervention:  Follow treatment plan  Care plan reviewed    Follow up:   Per protocol      Delia Delgado MS RD/LD CNSC  10:55 EST

## 2021-01-06 NOTE — PLAN OF CARE
Problem: Adult Inpatient Plan of Care  Goal: Plan of Care Review  Recent Flowsheet Documentation  Taken 1/6/2021 1351 by Adina Fontaine, PT  Progress: no change  Plan of Care Reviewed With:   patient   spouse  Outcome Summary: PT initial evaluation completed s/p CABG x2 on 1/5. Pt amb 60ft pushing tele monitor and min Ax2 and required min Ax2 for STS. Pt limited by c/o nausea and dizziness throughout. Pt requried mod Ax2 for sit>supine wiht cueing for sternal precautions and safety awareness d/t line management. Pt could benefit from continued skilled IP PT to improve functional mobility and increase independence with ambulation. D/c rec HWA.

## 2021-01-06 NOTE — PROGRESS NOTES
Discharge Planning Assessment  [facility]     Patient Name: Preeti Guerra  MRN: [id number]  Today's Date: 1/6/2021    Admit Date: 1/5/2021    Discharge Needs Assessment     Row Name 01/06/21 1359       Living Environment    Lives With  spouse    Name(s) of Who Lives With Patient  Emanuel Carson    Current Living Arrangements  home/apartment/condo    Primary Care Provided by  self    Provides Primary Care For  no one    Family Caregiver if Needed  spouse    Family Caregiver Names  SpouseEmanuel    Quality of Family Relationships  supportive    Able to Return to Prior Arrangements  yes    Living Arrangement Comments  Lives with spouse in [facility] in house with 2 steps at entrance.  Bed and bath on second floor.       Resource/Environmental Concerns    Resource/Environmental Concerns  none       Transition Planning    Patient/Family Anticipates Transition to  home    Patient/Family Anticipated Services at Transition  none    Transportation Anticipated  family or friend will provide       Discharge Needs Assessment    Readmission Within the Last 30 Days  no previous admission in last 30 days    Equipment Currently Used at Home  none    Concerns to be Addressed  discharge planning    Anticipated Changes Related to Illness  none    Equipment Needed After Discharge  none    Provided Post Acute Provider List?  N/A    N/A Provider List Comment  No anticipated need for post discharge services        Discharge Plan     Row Name 01/06/21 1401       Plan    Plan  Home    Patient/Family in Agreement with Plan  yes    Plan Comments  Met with patient and daughter in the room for initial discharge planning.  Had 2 vessel CABG on 1-5.  Lives with spouse in [facility] in house with 2 steps at entrance.  Bed and bath on second floor. Independent prior.  PCP is Elaine Marcos.  No DME or home health involved.  Up in the chair at time of CM visit.  O2 at 2L/NC.  Therapy recommending home with assistance.   Following for discharge needs.    Final Discharge Disposition Code  01 - home or self-care        Continued Care and Services - Admitted Since 1/5/2021    Coordination has not been started for this encounter.         Demographic Summary     Row Name 01/06/21 5492       General Information    Admission Type  inpatient    Arrived From  operating room    Referral Source  admission list;physician    Reason for Consult  discharge planning    Preferred Language  English        Functional Status     Row Name 01/06/21 1358       Functional Status    Usual Activity Tolerance  moderate    Current Activity Tolerance  fair       Functional Status, IADL    Medications  independent    Meal Preparation  independent    Housekeeping  independent    Laundry  independent    Shopping  independent        Psychosocial    No documentation.       Abuse/Neglect    No documentation.       Legal    No documentation.       Substance Abuse    No documentation.       Patient Forms    No documentation.           Edel Arreola RN

## 2021-01-06 NOTE — PROGRESS NOTES
CTS Progress Note      POD 1 s/p CABG x2       LOS: 1 day   Patient Care Team:  Elaine Marcos MD as PCP - General  Elaine Marcos MD as PCP - Family Medicine  Fatoumata Jasso MD as Consulting Physician (Cardiology)    Subjective  Awake, alert, up in chair  Mild nausea during the night, much better now  Low-dose norepinephrine for support  Oriented x3, cooperative    CC:     Objective    Vital Signs  Temp:  [95.7 °F (35.4 °C)-100.2 °F (37.9 °C)] 99.2 °F (37.3 °C)  Heart Rate:  [] 88  Resp:  [16-26] 18  BP: ()/(36-71) 94/48  Arterial Line BP: ()/(45-74) 95/49  FiO2 (%):  [30 %-100 %] 30 %    Physical Exam:   General Appearance:    Lungs: clear to auscultation, respirations regular, respirations even and respirations unlabored   Heart: regular rhythm & normal rate, normal S1, S2, no murmur, no gallop, no rub and no click   Skin: Warm, dry, sternum stable, incision c/d/i   Abdomen: Soft, nontender.  No rebound.  Results   Results from last 7 days   Lab Units 01/06/21  0324   WBC 10*3/mm3 11.30*   HEMOGLOBIN g/dL 8.8*   HEMATOCRIT % 27.5*   PLATELETS 10*3/mm3 166     Results from last 7 days   Lab Units 01/06/21  0324   SODIUM mmol/L 139   POTASSIUM mmol/L 3.8   CHLORIDE mmol/L 102   CO2 mmol/L 28.0   BUN mg/dL 12   CREATININE mg/dL 0.80   GLUCOSE mg/dL 116*   CALCIUM mg/dL 8.5*           Imaging Results (Last 24 Hours)     Procedure Component Value Units Date/Time    XR Chest 1 View [[id number]] Resulted: 01/06/21 0351     Updated: 01/06/21 0402    XR Chest 1 View [[id number]] Collected: 01/05/21 1115     Updated: 01/05/21 1704    Narrative:      EXAMINATION: XR CHEST 1 VW- 01/05/2021     INDICATION: Post-Op Check Line & Tube Placement;  I25.119-Atherosclerotic heart disease of native coronary artery with  unspecified angina pectoris      COMPARISON: Chest x-ray 01/04/2021      FINDINGS: Postsurgical appearance of the chest status post median  sternotomy with endotracheal tube  well-positioned above the level of the  edinson. Right internal jugular approach pulmonary arterial catheter  terminates at the level of the main pulmonary artery. Esophagogastric  tube courses below the diaphragm and out of the field-of-view.  Mediastinal and pleural drains in place. No pneumothorax or large  effusion. Degenerative changes of the spine.          Impression:      Postsurgical appearance of the chest without overt edema,  pneumothorax or large effusion with support hardware in satisfactory  position as detailed above.     D:  01/05/2021  E:  01/05/2021  .     This report was finalized on 1/5/2021 5:01 PM by Dr. Allan Varma.             Assessment      Coronary artery disease involving native coronary artery of native heart with angina pectoris (CMS/HCC)    Anxiety    Dyslipidemia    Hypothyroidism  Low-dose norepinephrine for support  Chest tubes 900 cc out previous 18 hours      Plan   We will DC pacing wires  We will DC [place]-Brock catheter, arterial line, Collins catheter  Ambulate  Pulmonary toilet  Chest tubes out tomorrow if drainage continues to slow.    CAYLA Jacobo  01/06/21  07:13 EST

## 2021-01-06 NOTE — PROGRESS NOTES
Intensive Care Follow-up     Hospital:  LOS: 1 day   Ms. Preeti Guerra, 65 y.o. female is followed for:   Coronary artery disease involving native coronary artery of native heart with angina pectoris (CMS/HCC)        History of present illness:   Preeti Guerra is a 65 y.o. female with HLP, Hypothyroidism and Anxiety who was admitted today for elective CABG by Dr. Moody.  She was referred to Dr. Jasso by her PCP for chest pain.  She has had c/o substernal chest pain when she walks the track at a good pace for several weeks.  She also had an episode of neck, chest, teeth and jaw pain that lasted less than 5 minutes while getting a manicure a few weeks ago.  She slowed her breathing and her symptoms resolved.  She had a stress test 9/25 that was an intermediate risk study due to patient's midsternal chest pain and ST depression that persisted into recovery.  She had a LHC 12/18 that revealed significant LM disease and noncritical disease of LAD and RCA.  Bilateral Carotid Duplex 12/18 was unremarkable.  ECHO 12/18 revealed EF 60%, mild AR and trace to mild MR.  She was referred to CTS for surgical revascularization, which was scheduled for today.      Patient is currently hemodynamically stable on nitroglycerin.  Afebrile with adequate urine output.  She continues on mechanical ventilation.    Subjective   Interval History:  Patient doing very well this morning.  Up in the chair without difficulties.  Pain is fairly well controlled.  Continues on norepinephrine for blood pressure support.  No other acute complaints at this time.           The patient's past medical, surgical and social history were reviewed and updated in Epic as appropriate.       Objective     Infusions:  insulin, 0-50 Units/hr, Last Rate: 1.6 Units/hr (01/06/21 0550)  niCARdipine, 5-15 mg/hr  nitroglycerin, 5-200 mcg/min, Last Rate: Stopped (01/05/21 1430)  norepinephrine, 0.02-0.3 mcg/kg/min, Last Rate: 0.08 mcg/kg/min  (01/06/21 0628)      Medications:  aspirin, 325 mg, Oral, Daily  atorvastatin, 40 mg, Oral, Nightly  cefuroxime, 1.5 g, Intravenous, Q8H  metoprolol tartrate, 12.5 mg, Oral, Q12H  metoprolol tartrate, 2.5 mg, Intravenous, Q6H  midodrine, 10 mg, Oral, Q8H  mupirocin, 1 application, Each Nare, Q12H  pharmacy consult - [facility], , Does not apply, Daily  senna-docusate sodium, 2 tablet, Oral, BID      I reviewed the patient's medications.    Vital Sign Min/Max for last 24 hours  Temp  Min: 95.7 °F (35.4 °C)  Max: 100.2 °F (37.9 °C)   BP  Min: 75/45  Max: 119/71   Pulse  Min: 78  Max: 110   Resp  Min: 16  Max: 26   SpO2  Min: 91 %  Max: 100 %   Flow (L/min)  Min: 1  Max: 2       Input/Output for last 24 hour shift  01/05 0701 - 01/06 0700  In: 4681 [P.O.:170; I.V.:2341]  Out: 2463 [Urine:1563]   FiO2 (%):  [30 %-100 %] 30 %  S RR:  [16] 16  PEEP/CPAP (cm H2O):  [5 cm H20] 5 cm H20  WV SUP:  [10 cm H20] 10 cm H20  MAP (cm H2O):  [7.9-16] 16  GENERAL : NAD, conversant  RESPIRATORY/THORAX : normal respiratory effort and no intercostal retractions, CTAB  CARDIOVASCULAR : Normal S1/S2, RRR.  No lower ext edema.  GASTROINTESTINAL : Soft, NT/ND. BS x 4 normoactive. No hepatosplenomegaly.  MUSCULOSKELETAL : No cyanosis, clubbing, or ischemia  NEUROLOGICAL: alert and oriented to person, place and time  PSYCHOLOGICAL : Appropriate affect    Results from last 7 days   Lab Units 01/06/21  0324 01/05/21  2351 01/05/21  2000 01/05/21  1510   WBC 10*3/mm3 11.30*  --  10.75 8.53   HEMOGLOBIN g/dL 8.8* 9.0* 8.7*  8.6* 8.7*   PLATELETS 10*3/mm3 166  --  155 141     Results from last 7 days   Lab Units 01/06/21  0324 01/05/21  2351 01/05/21 2000 01/05/21  1510 01/05/21  1103   SODIUM mmol/L 139  --   --  141 141   POTASSIUM mmol/L 3.8 3.9 3.7 3.8 3.5   CO2 mmol/L 28.0  --   --  26.0 24.0   BUN mg/dL 12  --   --  12 12   CREATININE mg/dL 0.80  --   --  0.83 0.78   MAGNESIUM mg/dL 1.7  --   --  1.6 1.9   PHOSPHORUS mg/dL 3.7  --   --  3.5 2.7    GLUCOSE mg/dL 116*  --   --  136* 99     Estimated Creatinine Clearance: 66.2 mL/min (by C-G formula based on SCr of 0.8 mg/dL).    Results from last 7 days   Lab Units 01/05/21  1416   PH, ARTERIAL pH units 7.360   PCO2, ARTERIAL mm Hg 46.7*   PO2 ART mm Hg 80.0*       I reviewed the patient's new clinical results.  I reviewed the patient's new imaging results/reports including actual images and agree with reports.              Imaging Results (Last 24 Hours)     Procedure Component Value Units Date/Time    XR Chest 1 View [[id number]] Resulted: 01/06/21 0351     Updated: 01/06/21 0402    XR Chest 1 View [[id number]] Collected: 01/05/21 1115     Updated: 01/05/21 1704    Narrative:      EXAMINATION: XR CHEST 1 VW- 01/05/2021     INDICATION: Post-Op Check Line & Tube Placement;  I25.119-Atherosclerotic heart disease of native coronary artery with  unspecified angina pectoris      COMPARISON: Chest x-ray 01/04/2021      FINDINGS: Postsurgical appearance of the chest status post median  sternotomy with endotracheal tube well-positioned above the level of the  edinson. Right internal jugular approach pulmonary arterial catheter  terminates at the level of the main pulmonary artery. Esophagogastric  tube courses below the diaphragm and out of the field-of-view.  Mediastinal and pleural drains in place. No pneumothorax or large  effusion. Degenerative changes of the spine.          Impression:      Postsurgical appearance of the chest without overt edema,  pneumothorax or large effusion with support hardware in satisfactory  position as detailed above.     D:  01/05/2021  E:  01/05/2021  .     This report was finalized on 1/5/2021 5:01 PM by Dr. Allan Varma.             Assessment/Plan   Impression        Coronary artery disease involving native coronary artery of native heart with angina pectoris (CMS/HCC)    Anxiety    Dyslipidemia    Hypothyroidism       Plan        65 y.o. female with HLP, Hypothyroidism and Anxiety  who was admitted today for elective CABG x2 by Dr. Moody.  Total bypass time was 58 minutes.  Patient arrived to the ICU on mechanical ventilation.  Patient was extubated on the evening of 1/5/2021     · ICU to follow  · Postoperative orders and chest tubes per CT surgery  · Continue to wean pressors per protocol  · Discontinue insulin drip and moved to sliding scale insulin  · Aspirin/statin/beta-blocker  · Duo nebs as needed  · PT/OT   · Restart home levothyroxine for hypothyroidism  · Anticoagulation per CT surgery    Plan of care and goals reviewed with multidisciplinary/antibiotic stewardship team during rounds.   I discussed the patient's findings and my recommendations with patient and nursing staff       Roel Montalvo DO  Pulmonary, Critical care and Sleep Medicine

## 2021-01-06 NOTE — PLAN OF CARE
Goal Outcome Evaluation:  Plan of Care Reviewed With: patient, spouse  Progress: no change  Outcome Summary: Pt is neuro intact although drowsy. ABREU. Sats >92% on 2 L NC. NSR with SBP . Pt not tolerating clear liquid diet with complaints of nausea. UOP approximately 350ml over shift. Pain managed with prn lortab. Will continue to monitor.
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
## 2021-01-06 NOTE — NURSING NOTE
Pt. Referred for Phase II Cardiac Rehab. Staff discussed benefits of exercise, program protocol, and educational material provided. Teach back verified.  Brochure left with  patient to call once she decides if she will attend [facility] or [facility] Cardiac Rehab program.

## 2021-01-06 NOTE — PROGRESS NOTES
[facility] Cardiology Daily Note       LOS: 1 day   Patient Care Team:  Elaine Marcos MD as PCP - General  Elaine Marcos MD as PCP - Family Medicine  RomeroFatoumata oneil MD as Consulting Physician (Cardiology)    Chief Complaint: Hyperlipidemia/hypotension    Subjective     Subjective: No complaints this morning.  Overall doing well.  On norepinephrine at 0.08 mcg/kg/min for hypotension.  Got up to the chair this morning.  Has not walked yet.  99% on 1 L of oxygen    Review of Systems:   As above.    Medications:  aspirin, 325 mg, Oral, Daily  atorvastatin, 40 mg, Oral, Nightly  cefuroxime, 1.5 g, Intravenous, Q8H  metoprolol tartrate, 12.5 mg, Oral, Q12H  metoprolol tartrate, 2.5 mg, Intravenous, Q6H  mupirocin, 1 application, Each Nare, Q12H  pharmacy consult - [facility], , Does not apply, Daily  senna-docusate sodium, 2 tablet, Oral, BID        Objective     Vital Sign Min/Max for last 24 hours  Temp  Min: 95.7 °F (35.4 °C)  Max: 100.2 °F (37.9 °C)   BP  Min: 75/45  Max: 119/71   Pulse  Min: 78  Max: 110   Resp  Min: 16  Max: 26   SpO2  Min: 91 %  Max: 100 %   Flow (L/min)  Min: 1  Max: 2   No data recorded      Intake/Output Summary (Last 24 hours) at 1/6/2021 0729  Last data filed at 1/6/2021 0600  Gross per 24 hour   Intake 4681 ml   Output 2463 ml   Net 2218 ml            Physical Exam:    General: Alert and oriented.   Cardiovascular: Heart has a nondisplaced focal PMI. Regular rate and rhythm without murmur, gallop or rub.  Lungs: Clear without rales or wheezes. Equal expansion is noted.   Abdomen: Soft, nontender.  Extremities: Show no edema.   Skin: warm and dry.     Results Review:    I reviewed the patient's new clinical results.  EKG:  Tele: Sinus rhythm    Labs:    Results from last 7 days   Lab Units 01/06/21  0324 01/05/21  2351 01/05/21  2000 01/05/21  1510 01/05/21  1103 01/04/21  1515   SODIUM mmol/L 139  --   --  141 141 139   POTASSIUM mmol/L 3.8 3.9 3.7 3.8 3.5  3.6   CHLORIDE mmol/L 102  --   --  105 105 100   CO2 mmol/L 28.0  --   --  26.0 24.0 29.0   BUN mg/dL 12  --   --  12 12 22   CREATININE mg/dL 0.80  --   --  0.83 0.78 0.85   CALCIUM mg/dL 8.5*  --   --  9.4 10.2 9.9   BILIRUBIN mg/dL  --   --   --   --   --  0.5   ALK PHOS U/L  --   --   --   --   --  106   ALT (SGPT) U/L  --   --   --   --   --  16   AST (SGOT) U/L  --   --   --   --   --  20   GLUCOSE mg/dL 116*  --   --  136* 99 115*     Results from last 7 days   Lab Units 01/06/21  0324 01/05/21  2351 01/05/21 2000 01/05/21  1510   WBC 10*3/mm3 11.30*  --  10.75 8.53   HEMOGLOBIN g/dL 8.8* 9.0* 8.7*  8.6* 8.7*   HEMATOCRIT % 27.5* 28.4* 27.2*  27.2* 27.4*   PLATELETS 10*3/mm3 166  --  155 141     No results found for: TROPONINI, TROPONINT  Lab Results   Component Value Date    CHOL 227 (H) 12/18/2020     Lab Results   Component Value Date    TRIG 156 (H) 12/18/2020     Lab Results   Component Value Date    HDL 48 12/18/2020     No components found for: LDLCALC  Lab Results   Component Value Date    INR 1.47 (H) 01/06/2021    INR 1.61 (H) 01/05/2021    INR 1.61 (H) 01/05/2021    PROTIME 17.5 (H) 01/06/2021    PROTIME 18.8 (H) 01/05/2021    PROTIME 18.9 (H) 01/05/2021         Ejection Fraction:    Assessment   Assessment:  1. Coronary artery disease  - [facility] 12/28/2020: significant left main disease by IFR, non critical disease in LAD, circumflex and RCA.   - Echo 12/18/2020: EF 60%, mild AR, trace to mild MR.  - s/p 2V CABG (LIMA to LAD, SVG to OM1), Dr. Moody, 1/5/2020    2.  Hypotension     3.  hyperlipidemia      Plan:    Begin midodrine 10 mg every 8 hours  Wean norepinephrine as able  Ambulate as tolerated  Blood pressure too low for beta-blocker at this time.  Continue aspirin for coronary disease  Continue statin for coronary disease    Fatoumata Jasso MD  01/06/21  07:29 EST

## 2021-01-06 NOTE — PLAN OF CARE
Goal Outcome Evaluation:  Plan of Care Reviewed With: patient   Pt. Neuro intact. On 1 L NC. 340 ml from MTs. 340 ml UOP. Pt. Nauseous/throwing up earlier in shift: reglan given, also nauseous this am after getting up to the chair. On levophed drip, 500 ml more of albumin given (1500 ml total). Insulin drip started. PAs: 20s/10s, CVP: 6-10. HR: .  SBP: . Will continue to monitor.

## 2021-01-07 ENCOUNTER — APPOINTMENT (OUTPATIENT)
Dept: GENERAL RADIOLOGY | Facility: HOSPITAL | Age: 66
End: 2021-01-07

## 2021-01-07 LAB
ANION GAP SERPL CALCULATED.3IONS-SCNC: 5 MMOL/L (ref 5–15)
BUN SERPL-MCNC: 12 MG/DL (ref 8–23)
BUN/CREAT SERPL: 19.4 (ref 7–25)
CALCIUM SPEC-SCNC: 9.1 MG/DL (ref 8.6–10.5)
CHLORIDE SERPL-SCNC: 97 MMOL/L (ref 98–107)
CO2 SERPL-SCNC: 30 MMOL/L (ref 22–29)
CREAT SERPL-MCNC: 0.62 MG/DL (ref 0.57–1)
DEPRECATED RDW RBC AUTO: 50.4 FL (ref 37–54)
ERYTHROCYTE [DISTWIDTH] IN BLOOD BY AUTOMATED COUNT: 14.6 % (ref 12.3–15.4)
GFR SERPL CREATININE-BSD FRML MDRD: 96 ML/MIN/1.73
GLUCOSE BLDC GLUCOMTR-MCNC: 114 MG/DL (ref 70–130)
GLUCOSE BLDC GLUCOMTR-MCNC: 116 MG/DL (ref 70–130)
GLUCOSE BLDC GLUCOMTR-MCNC: 122 MG/DL (ref 70–130)
GLUCOSE BLDC GLUCOMTR-MCNC: 130 MG/DL (ref 70–130)
GLUCOSE SERPL-MCNC: 120 MG/DL (ref 65–99)
HCT VFR BLD AUTO: 29.3 % (ref 34–46.6)
HGB BLD-MCNC: 9.2 G/DL (ref 12–15.9)
MAGNESIUM SERPL-MCNC: 2.4 MG/DL (ref 1.6–2.4)
MCH RBC QN AUTO: 29.9 PG (ref 26.6–33)
MCHC RBC AUTO-ENTMCNC: 31.4 G/DL (ref 31.5–35.7)
MCV RBC AUTO: 95.1 FL (ref 79–97)
PLATELET # BLD AUTO: 131 10*3/MM3 (ref 140–450)
PMV BLD AUTO: 10.9 FL (ref 6–12)
POTASSIUM SERPL-SCNC: 4.6 MMOL/L (ref 3.5–5.2)
QT INTERVAL: 382 MS
QTC INTERVAL: 469 MS
RBC # BLD AUTO: 3.08 10*6/MM3 (ref 3.77–5.28)
SODIUM SERPL-SCNC: 132 MMOL/L (ref 136–145)
WBC # BLD AUTO: 10.89 10*3/MM3 (ref 3.4–10.8)

## 2021-01-07 PROCEDURE — 85027 COMPLETE CBC AUTOMATED: CPT | Performed by: PHYSICIAN ASSISTANT

## 2021-01-07 PROCEDURE — 99232 SBSQ HOSP IP/OBS MODERATE 35: CPT | Performed by: INTERNAL MEDICINE

## 2021-01-07 PROCEDURE — 71045 X-RAY EXAM CHEST 1 VIEW: CPT

## 2021-01-07 PROCEDURE — 97530 THERAPEUTIC ACTIVITIES: CPT

## 2021-01-07 PROCEDURE — 99024 POSTOP FOLLOW-UP VISIT: CPT | Performed by: PHYSICIAN ASSISTANT

## 2021-01-07 PROCEDURE — 80048 BASIC METABOLIC PNL TOTAL CA: CPT | Performed by: PHYSICIAN ASSISTANT

## 2021-01-07 PROCEDURE — 94799 UNLISTED PULMONARY SVC/PX: CPT

## 2021-01-07 PROCEDURE — 93005 ELECTROCARDIOGRAM TRACING: CPT | Performed by: PHYSICIAN ASSISTANT

## 2021-01-07 PROCEDURE — 25010000002 ONDANSETRON PER 1 MG: Performed by: PHYSICIAN ASSISTANT

## 2021-01-07 PROCEDURE — 25010000003 CEFUROXIME SODIUM 1.5 G RECONSTITUTED SOLUTION: Performed by: PHYSICIAN ASSISTANT

## 2021-01-07 PROCEDURE — 25010000002 PROCHLORPERAZINE 10 MG/2ML SOLUTION: Performed by: INTERNAL MEDICINE

## 2021-01-07 PROCEDURE — 83735 ASSAY OF MAGNESIUM: CPT | Performed by: STUDENT IN AN ORGANIZED HEALTH CARE EDUCATION/TRAINING PROGRAM

## 2021-01-07 PROCEDURE — 82962 GLUCOSE BLOOD TEST: CPT

## 2021-01-07 PROCEDURE — 25010000002 FUROSEMIDE PER 20 MG: Performed by: STUDENT IN AN ORGANIZED HEALTH CARE EDUCATION/TRAINING PROGRAM

## 2021-01-07 RX ORDER — SODIUM CHLORIDE 0.9 % (FLUSH) 0.9 %
10 SYRINGE (ML) INJECTION EVERY 8 HOURS SCHEDULED
Status: DISCONTINUED | OUTPATIENT
Start: 2021-01-07 | End: 2021-01-09 | Stop reason: HOSPADM

## 2021-01-07 RX ORDER — SODIUM CHLORIDE 0.9 % (FLUSH) 0.9 %
10 SYRINGE (ML) INJECTION EVERY 12 HOURS SCHEDULED
Status: DISCONTINUED | OUTPATIENT
Start: 2021-01-07 | End: 2021-01-09 | Stop reason: HOSPADM

## 2021-01-07 RX ORDER — FUROSEMIDE 10 MG/ML
20 INJECTION INTRAMUSCULAR; INTRAVENOUS ONCE
Status: COMPLETED | OUTPATIENT
Start: 2021-01-07 | End: 2021-01-07

## 2021-01-07 RX ADMIN — LEVOTHYROXINE SODIUM 25 MCG: 25 TABLET ORAL at 05:23

## 2021-01-07 RX ADMIN — MIDODRINE HYDROCHLORIDE 10 MG: 5 TABLET ORAL at 05:23

## 2021-01-07 RX ADMIN — ASPIRIN 325 MG: 325 TABLET, COATED ORAL at 08:55

## 2021-01-07 RX ADMIN — HYDROCODONE BITARTRATE AND ACETAMINOPHEN 1 TABLET: 7.5; 325 TABLET ORAL at 15:19

## 2021-01-07 RX ADMIN — DOCUSATE SODIUM 50MG AND SENNOSIDES 8.6MG 2 TABLET: 8.6; 5 TABLET, FILM COATED ORAL at 08:55

## 2021-01-07 RX ADMIN — FUROSEMIDE 20 MG: 10 INJECTION, SOLUTION INTRAMUSCULAR; INTRAVENOUS at 08:54

## 2021-01-07 RX ADMIN — MIDODRINE HYDROCHLORIDE 10 MG: 5 TABLET ORAL at 13:51

## 2021-01-07 RX ADMIN — ONDANSETRON 4 MG: 2 INJECTION INTRAMUSCULAR; INTRAVENOUS at 08:54

## 2021-01-07 RX ADMIN — MIDODRINE HYDROCHLORIDE 10 MG: 5 TABLET ORAL at 21:11

## 2021-01-07 RX ADMIN — ATORVASTATIN CALCIUM 40 MG: 40 TABLET, FILM COATED ORAL at 21:11

## 2021-01-07 RX ADMIN — PROCHLORPERAZINE EDISYLATE 10 MG: 5 INJECTION INTRAMUSCULAR; INTRAVENOUS at 09:50

## 2021-01-07 RX ADMIN — DOCUSATE SODIUM 50MG AND SENNOSIDES 8.6MG 2 TABLET: 8.6; 5 TABLET, FILM COATED ORAL at 21:11

## 2021-01-07 RX ADMIN — CEFUROXIME SODIUM 1.5 G: 1.5 INJECTION, POWDER, FOR SOLUTION INTRAVENOUS at 02:09

## 2021-01-07 RX ADMIN — OXYCODONE AND ACETAMINOPHEN 2 TABLET: 5; 325 TABLET ORAL at 03:51

## 2021-01-07 RX ADMIN — SODIUM CHLORIDE, PRESERVATIVE FREE 10 ML: 5 INJECTION INTRAVENOUS at 21:11

## 2021-01-07 RX ADMIN — HYDROCODONE BITARTRATE AND ACETAMINOPHEN 1 TABLET: 7.5; 325 TABLET ORAL at 22:26

## 2021-01-07 RX ADMIN — SODIUM CHLORIDE, PRESERVATIVE FREE 10 ML: 5 INJECTION INTRAVENOUS at 17:52

## 2021-01-07 RX ADMIN — METOPROLOL TARTRATE 12.5 MG: 25 TABLET, FILM COATED ORAL at 21:11

## 2021-01-07 NOTE — PROGRESS NOTES
Multidisciplinary Rounds    Time: 20min  Patient Name: Preeti Guerra  Date of Encounter: 01/07/21 09:19 EST  MRN: [id number]  Admission date: 1/5/2021      Reason for visit: MDR. RD to continue to follow per protocol.     Additional information obtained during MDR: Pt with orders to be transferred to the floor. Pt continues to have nausea- zofran and compazine PRN ordered.    Current diet: Diet Regular; Cardiac, Gluten Free      Intervention:  Follow treatment plan  Care plan reviewed    Follow up:   Per protocol      Delia Delgado MS RD/LD [facility]  09:19 EST

## 2021-01-07 NOTE — PLAN OF CARE
Problem: Adult Inpatient Plan of Care  Goal: Plan of Care Review  Recent Flowsheet Documentation  Taken 1/7/2021 1111 by Sarah Paul, PT  Progress: improving  Plan of Care Reviewed With: patient  Outcome Summary: patient was able to increase ambulation to 150 ft with min assist she required only CGA to transfer STS. patient was limited by nausea today otherwise she is progressing as expected with mobility   Goal Outcome Evaluation:  Plan of Care Reviewed With: patient  Progress: improving  Outcome Summary: patient was able to increase ambulation to 150 ft with min assist she required only CGA to transfer STS. patient was limited by nausea today otherwise she is progressing as expected with mobility

## 2021-01-07 NOTE — PROGRESS NOTES
Intensive Care Follow-up     Hospital:  LOS: 2 days   Ms. Preeti Guerra, 65 y.o. female is followed for:   Coronary artery disease involving native coronary artery of native heart with angina pectoris (CMS/HCC)            History of present illness:   Preeti Guerra is a 65 y.o. female with HLP, Hypothyroidism and Anxiety who was admitted today for elective CABG by Dr. Moody.  She was referred to Dr. Jasso by her PCP for chest pain.  She has had c/o substernal chest pain when she walks the track at a good pace for several weeks.  She also had an episode of neck, chest, teeth and jaw pain that lasted less than 5 minutes while getting a manicure a few weeks ago.  She slowed her breathing and her symptoms resolved.  She had a stress test 9/25 that was an intermediate risk study due to patient's midsternal chest pain and ST depression that persisted into recovery.  She had a LHC 12/18 that revealed significant LM disease and noncritical disease of LAD and RCA.  Bilateral Carotid Duplex 12/18 was unremarkable.  ECHO 12/18 revealed EF 60%, mild AR and trace to mild MR.  She was referred to CTS for surgical revascularization, which was scheduled for today.      Patient is currently hemodynamically stable on nitroglycerin.  Afebrile with adequate urine output.  She continues on mechanical ventilation.      Subjective   Interval History:  Patient is doing very well this morning, she is up walking the unit.  Pain is well controlled.  No acute complaints.             The patient's past medical, surgical and social history were reviewed and updated in Epic as appropriate.       Objective     Infusions:  niCARdipine, 5-15 mg/hr  nitroglycerin, 5-200 mcg/min, Last Rate: Stopped (01/05/21 1430)  norepinephrine, 0.02-0.3 mcg/kg/min, Last Rate: Stopped (01/06/21 1258)      Medications:  aspirin, 325 mg, Oral, Daily  atorvastatin, 40 mg, Oral, Nightly  insulin lispro, 0-7 Units, Subcutaneous, TID  AC  levothyroxine, 25 mcg, Oral, Q AM  metoprolol tartrate, 12.5 mg, Oral, Q12H  midodrine, 10 mg, Oral, Q8H  pharmacy consult - [facility], , Does not apply, Daily  senna-docusate sodium, 2 tablet, Oral, BID      I reviewed the patient's medications.    Vital Sign Min/Max for last 24 hours  Temp  Min: 97.5 °F (36.4 °C)  Max: 98.1 °F (36.7 °C)   BP  Min: 87/57  Max: 114/46   Pulse  Min: 75  Max: 91   Resp  Min: 10  Max: 20   SpO2  Min: 91 %  Max: 99 %   Flow (L/min)  Min: 1  Max: 2       Input/Output for last 24 hour shift  01/06 0701 - 01/07 0700  In: 1052.6 [P.O.:150; I.V.:902.6]  Out: 875 [Urine:615]      GENERAL : NAD, conversant  RESPIRATORY/THORAX : normal respiratory effort and no intercostal retractions, CTAB  CARDIOVASCULAR : Normal S1/S2, RRR.  No lower ext edema.  GASTROINTESTINAL : Soft, NT/ND. BS x 4 normoactive. No hepatosplenomegaly.  MUSCULOSKELETAL : No cyanosis, clubbing, or ischemia  NEUROLOGICAL: alert and oriented to person, place and time  PSYCHOLOGICAL : Appropriate affect    Results from last 7 days   Lab Units 01/07/21  0357 01/06/21  0324 01/05/21  2351 01/05/21  2000   WBC 10*3/mm3 10.89* 11.30*  --  10.75   HEMOGLOBIN g/dL 9.2* 8.8* 9.0* 8.7*  8.6*   PLATELETS 10*3/mm3 131* 166  --  155     Results from last 7 days   Lab Units 01/07/21  0357 01/06/21  0324 01/05/21  2351  01/05/21  1510 01/05/21  1103   SODIUM mmol/L 132* 139  --   --  141 141   POTASSIUM mmol/L 4.6 3.8 3.9   < > 3.8 3.5   CO2 mmol/L 30.0* 28.0  --   --  26.0 24.0   BUN mg/dL 12 12  --   --  12 12   CREATININE mg/dL 0.62 0.80  --   --  0.83 0.78   MAGNESIUM mg/dL 2.4 1.7  --   --  1.6 1.9   PHOSPHORUS mg/dL  --  3.7  --   --  3.5 2.7   GLUCOSE mg/dL 120* 116*  --   --  136* 99    < > = values in this interval not displayed.     Estimated Creatinine Clearance: 66.2 mL/min (by C-G formula based on SCr of 0.62 mg/dL).    Results from last 7 days   Lab Units 01/05/21  1416   PH, ARTERIAL pH units 7.360   PCO2, ARTERIAL mm Hg 46.7*    PO2 ART mm Hg 80.0*       I reviewed the patient's new clinical results.  I reviewed the patient's new imaging results/reports including actual images and agree with reports.              Imaging Results (Last 24 Hours)     Procedure Component Value Units Date/Time    XR Chest 1 View [[id number]] Collected: 01/07/21 0831     Updated: 01/07/21 0835    Narrative:      EXAMINATION: XR CHEST 1 VW-01/07/2021:     INDICATION: Post-Op Heart Surgery; I25.119-Atherosclerotic heart disease  of native coronary artery with unspecified angina pectoris.     COMPARISON: 01/06/2021.     FINDINGS: The heart is normal in size. The vasculature appears normal.  Left basilar atelectasis has improved. No pneumothorax is seen.       Impression:      Mildly improving left basilar atelectasis. No new chest  disease is seen.     D:  01/07/2021  E:  01/07/2021                    Assessment/Plan   Impression        Coronary artery disease involving native coronary artery of native heart with angina pectoris (CMS/[facility])    Anxiety    Dyslipidemia    Hypothyroidism       Plan        65 y.o. female with HLP, Hypothyroidism and Anxiety who was admitted today for elective CABG x2 by Dr. Moody.  Total bypass time was 58 minutes.  Patient arrived to the ICU on mechanical ventilation.  Patient was extubated on the evening of 1/5/2021     · ICU to follow  · Continue to wean pressors per protocol  · Continue sliding-scale insulin for blood sugar control  · Aspirin/statin/beta-blocker  · Duo nebs as needed  · PT/OT   · Continue home levothyroxine for hypothyroidism  · Anticoagulation per CT surgery     Plan of care and goals reviewed with multidisciplinary/antibiotic stewardship team during rounds.   I discussed the patient's findings and my recommendations with patient and nursing staff       Roel Montalvo DO  Pulmonary, Critical care and Sleep Medicine

## 2021-01-07 NOTE — PROGRESS NOTES
Continued Stay Note  [facility]     Patient Name: Preeti Guerra  MRN: [id number]  Today's Date: 1/7/2021    Admit Date: 1/5/2021    Discharge Plan     Row Name 01/07/21 1229       Plan    Plan  Home    Patient/Family in Agreement with Plan  yes    Plan Comments  Met with patient and spouse in the room.  Doing well.  Some nausea today.  Plan is home.  Following    Final Discharge Disposition Code  01 - home or self-care        Discharge Codes    No documentation.             Edel Arreola RN
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
## 2021-01-07 NOTE — PROGRESS NOTES
CTS Progress Note      POD 2 s/p CABG x2       LOS: 2 days   Patient Care Team:  Elaine Marcos MD as PCP - General  Elaine Marcos MD as PCP - Family Medicine  Fatoumata Jasso MD as Consulting Physician (Cardiology)    Subjective  Awake, alert, up in chair  Mild nausea but improving  No pressors    CC:     Objective    Vital Signs  Temp:  [97.5 °F (36.4 °C)-98.1 °F (36.7 °C)] 97.5 °F (36.4 °C)  Heart Rate:  [77-91] 80  Resp:  [10-20] 18  BP: ()/(46-75) 96/60  Arterial Line BP: ()/(47-57) 122/57    Physical Exam:   General Appearance:    Lungs: clear to auscultation, respirations regular, respirations even and respirations unlabored   Heart: regular rhythm & normal rate, normal S1, S2, no murmur, no gallop, no rub and no click   Skin: Warm, dry, sternum stable, incision c/d/i     Results   Results from last 7 days   Lab Units 01/07/21  0357   WBC 10*3/mm3 10.89*   HEMOGLOBIN g/dL 9.2*   HEMATOCRIT % 29.3*   PLATELETS 10*3/mm3 131*     Results from last 7 days   Lab Units 01/07/21  0357   SODIUM mmol/L 132*   POTASSIUM mmol/L 4.6   CHLORIDE mmol/L 97*   CO2 mmol/L 30.0*   BUN mg/dL 12   CREATININE mg/dL 0.62   GLUCOSE mg/dL 120*   CALCIUM mg/dL 9.1           Imaging Results (Last 24 Hours)     Procedure Component Value Units Date/Time    XR Chest 1 View [[id number]] Resulted: 01/07/21 0342     Updated: 01/07/21 0348    XR Chest 1 View [[id number]] Collected: 01/06/21 0843     Updated: 01/06/21 0847    Narrative:      EXAMINATION: XR CHEST 1 VW-      INDICATION: Post-Op Heart Surgery; I25.119-Atherosclerotic heart disease  of native coronary artery with unspecified angina pectoris      COMPARISON: One day prior     FINDINGS: Interval extubation and removal of nasogastric tube. Remaining  support hardware projects unchanged. Mildly diminished lung volumes with  some minimal increased atelectasis. No new focal airspace consolidation.  No significant effusion or distinct pneumothorax.  Unchanged heart and  mediastinal contours.       Impression:      Interval extubation and removal of nasogastric tube.  Remaining support hardware projects unchanged. Mildly diminished lung  volumes with some minimal increased atelectasis. No new focal airspace  consolidation. No significant effusion or distinct pneumothorax.  Unchanged heart and mediastinal contours.     This report was finalized on 1/6/2021 8:44 AM by Rogelio Fox.             Assessment      Coronary artery disease involving native coronary artery of native heart with angina pectoris (CMS/HCC)    Anxiety    Dyslipidemia    Hypothyroidism  Hemodynamically stable  Chest tubes 260 cc out previous 24 hours    Plan   We will DC mediastinal tubes, pleural tubes  Transfer to telemetry  Ambulate    CAYLA Jacobo  01/07/21  07:04 EST

## 2021-01-07 NOTE — PLAN OF CARE
Goal Outcome Evaluation:  Plan of Care Reviewed With: patient, spouse  Progress: no change   Pt. Neuro intact, attempted evening ambulation, made it to the door then pt. Became too nauseous to continue: zofran given. SBP , on midodrine. 170 ml from MTs. UOP marginal at 220 ml for the shift. Pt. Not having much PO intake.

## 2021-01-08 LAB
ANION GAP SERPL CALCULATED.3IONS-SCNC: 10 MMOL/L (ref 5–15)
BH BB BLOOD EXPIRATION DATE: NORMAL
BH BB BLOOD EXPIRATION DATE: NORMAL
BH BB BLOOD TYPE BARCODE: 7300
BH BB BLOOD TYPE BARCODE: 7300
BH BB DISPENSE STATUS: NORMAL
BH BB DISPENSE STATUS: NORMAL
BH BB PRODUCT CODE: NORMAL
BH BB PRODUCT CODE: NORMAL
BH BB UNIT NUMBER: NORMAL
BH BB UNIT NUMBER: NORMAL
BUN SERPL-MCNC: 20 MG/DL (ref 8–23)
BUN/CREAT SERPL: 28.6 (ref 7–25)
CALCIUM SPEC-SCNC: 9.1 MG/DL (ref 8.6–10.5)
CHLORIDE SERPL-SCNC: 98 MMOL/L (ref 98–107)
CO2 SERPL-SCNC: 30 MMOL/L (ref 22–29)
CREAT SERPL-MCNC: 0.7 MG/DL (ref 0.57–1)
CROSSMATCH INTERPRETATION: NORMAL
CROSSMATCH INTERPRETATION: NORMAL
DEPRECATED RDW RBC AUTO: 50.4 FL (ref 37–54)
ERYTHROCYTE [DISTWIDTH] IN BLOOD BY AUTOMATED COUNT: 14.3 % (ref 12.3–15.4)
GFR SERPL CREATININE-BSD FRML MDRD: 84 ML/MIN/1.73
GLUCOSE BLDC GLUCOMTR-MCNC: 110 MG/DL (ref 70–130)
GLUCOSE BLDC GLUCOMTR-MCNC: 97 MG/DL (ref 70–130)
GLUCOSE SERPL-MCNC: 105 MG/DL (ref 65–99)
HCT VFR BLD AUTO: 28.1 % (ref 34–46.6)
HGB BLD-MCNC: 8.9 G/DL (ref 12–15.9)
MCH RBC QN AUTO: 30.4 PG (ref 26.6–33)
MCHC RBC AUTO-ENTMCNC: 31.7 G/DL (ref 31.5–35.7)
MCV RBC AUTO: 95.9 FL (ref 79–97)
PLATELET # BLD AUTO: 153 10*3/MM3 (ref 140–450)
PMV BLD AUTO: 11.3 FL (ref 6–12)
POTASSIUM SERPL-SCNC: 3.7 MMOL/L (ref 3.5–5.2)
RBC # BLD AUTO: 2.93 10*6/MM3 (ref 3.77–5.28)
SODIUM SERPL-SCNC: 138 MMOL/L (ref 136–145)
UNIT  ABO: NORMAL
UNIT  ABO: NORMAL
UNIT  RH: NORMAL
UNIT  RH: NORMAL
WBC # BLD AUTO: 8.65 10*3/MM3 (ref 3.4–10.8)

## 2021-01-08 PROCEDURE — 99232 SBSQ HOSP IP/OBS MODERATE 35: CPT | Performed by: INTERNAL MEDICINE

## 2021-01-08 PROCEDURE — 85027 COMPLETE CBC AUTOMATED: CPT | Performed by: PHYSICIAN ASSISTANT

## 2021-01-08 PROCEDURE — 25010000002 FUROSEMIDE PER 20 MG: Performed by: PHYSICIAN ASSISTANT

## 2021-01-08 PROCEDURE — 97110 THERAPEUTIC EXERCISES: CPT

## 2021-01-08 PROCEDURE — 82962 GLUCOSE BLOOD TEST: CPT

## 2021-01-08 PROCEDURE — 97116 GAIT TRAINING THERAPY: CPT

## 2021-01-08 PROCEDURE — 99233 SBSQ HOSP IP/OBS HIGH 50: CPT | Performed by: FAMILY MEDICINE

## 2021-01-08 PROCEDURE — 80048 BASIC METABOLIC PNL TOTAL CA: CPT | Performed by: PHYSICIAN ASSISTANT

## 2021-01-08 PROCEDURE — 25010000002 ONDANSETRON PER 1 MG: Performed by: PHYSICIAN ASSISTANT

## 2021-01-08 RX ORDER — ASPIRIN 81 MG/1
81 TABLET ORAL DAILY
Status: DISCONTINUED | OUTPATIENT
Start: 2021-01-09 | End: 2021-01-09 | Stop reason: HOSPADM

## 2021-01-08 RX ORDER — FUROSEMIDE 10 MG/ML
20 INJECTION INTRAMUSCULAR; INTRAVENOUS ONCE
Status: COMPLETED | OUTPATIENT
Start: 2021-01-08 | End: 2021-01-08

## 2021-01-08 RX ORDER — ROSUVASTATIN CALCIUM 10 MG/1
10 TABLET, COATED ORAL NIGHTLY
Status: DISCONTINUED | OUTPATIENT
Start: 2021-01-08 | End: 2021-01-09 | Stop reason: HOSPADM

## 2021-01-08 RX ADMIN — ACETAMINOPHEN 650 MG: 325 TABLET, FILM COATED ORAL at 22:13

## 2021-01-08 RX ADMIN — ACETAMINOPHEN 650 MG: 325 TABLET, FILM COATED ORAL at 13:54

## 2021-01-08 RX ADMIN — MIDODRINE HYDROCHLORIDE 10 MG: 5 TABLET ORAL at 22:13

## 2021-01-08 RX ADMIN — MIDODRINE HYDROCHLORIDE 10 MG: 5 TABLET ORAL at 13:54

## 2021-01-08 RX ADMIN — DOCUSATE SODIUM 50MG AND SENNOSIDES 8.6MG 2 TABLET: 8.6; 5 TABLET, FILM COATED ORAL at 08:46

## 2021-01-08 RX ADMIN — ONDANSETRON 4 MG: 2 INJECTION INTRAMUSCULAR; INTRAVENOUS at 02:17

## 2021-01-08 RX ADMIN — SODIUM CHLORIDE, PRESERVATIVE FREE 10 ML: 5 INJECTION INTRAVENOUS at 22:16

## 2021-01-08 RX ADMIN — ONDANSETRON 4 MG: 2 INJECTION INTRAMUSCULAR; INTRAVENOUS at 09:40

## 2021-01-08 RX ADMIN — DOCUSATE SODIUM 50MG AND SENNOSIDES 8.6MG 2 TABLET: 8.6; 5 TABLET, FILM COATED ORAL at 22:13

## 2021-01-08 RX ADMIN — FUROSEMIDE 20 MG: 10 INJECTION, SOLUTION INTRAMUSCULAR; INTRAVENOUS at 09:40

## 2021-01-08 RX ADMIN — SODIUM CHLORIDE, PRESERVATIVE FREE 10 ML: 5 INJECTION INTRAVENOUS at 08:47

## 2021-01-08 RX ADMIN — LEVOTHYROXINE SODIUM 25 MCG: 25 TABLET ORAL at 05:53

## 2021-01-08 RX ADMIN — ASPIRIN 325 MG: 325 TABLET, COATED ORAL at 08:47

## 2021-01-08 RX ADMIN — MIDODRINE HYDROCHLORIDE 10 MG: 5 TABLET ORAL at 05:53

## 2021-01-08 RX ADMIN — ROSUVASTATIN CALCIUM 10 MG: 10 TABLET, COATED ORAL at 22:13

## 2021-01-08 RX ADMIN — SODIUM CHLORIDE, PRESERVATIVE FREE 10 ML: 5 INJECTION INTRAVENOUS at 05:53

## 2021-01-08 NOTE — PROGRESS NOTES
Continued Stay Note  [facility]     Patient Name: Preeti Guerra  MRN: [id number]  Today's Date: 1/8/2021    Admit Date: 1/5/2021    Discharge Plan     Row Name 01/08/21 1402       Plan    Plan  Home    Patient/Family in Agreement with Plan  yes    Plan Comments  Met & spoke with Mrs Guerra and her spouse in the room. Up in chair. Wearing 2LNC. Reports feeling better. PT DC patient today and recs home with assist. Possible DC 1/9. No DC needs voiced for case mgmt to address.  Plan is home with spouse to transport via car. Case mgmt is following.    Final Discharge Disposition Code  01 - home or self-care        Discharge Codes    No documentation.             Swati Ritter RN

## 2021-01-08 NOTE — PROGRESS NOTES
Cardiothoracic Surgery Progress Note      POD #3 s/p CABG x2       LOS: 3 days      Subjective:  Feels significantly better after Lasix administration yesterday.  Her blood pressure is stable and she is comfortable on the floor resting comfortably      Objective:  Vital Signs  Temp:  [97.9 °F (36.6 °C)-98.5 °F (36.9 °C)] 98.3 °F (36.8 °C)  Heart Rate:  [72-97] 77  Resp:  [16-20] 18  BP: (100-116)/(46-67) 113/67    Physical Exam:   General Appearance: alert, appears stated age and cooperative   Lungs: clear to auscultation, respirations regular, respirations even and respirations unlabored   Heart: regular rhythm & normal rate, normal S1, S2, no murmur, no gallop, no rub and no click   Skin: Incision c/d/i     Results:  Results from last 7 days   Lab Units 01/08/21  0538   WBC 10*3/mm3 8.65   HEMOGLOBIN g/dL 8.9*   HEMATOCRIT % 28.1*   PLATELETS 10*3/mm3 153     Results from last 7 days   Lab Units 01/07/21  0357   SODIUM mmol/L 132*   POTASSIUM mmol/L 4.6   CHLORIDE mmol/L 97*   CO2 mmol/L 30.0*   BUN mg/dL 12   CREATININE mg/dL 0.62   GLUCOSE mg/dL 120*   CALCIUM mg/dL 9.1         Assessment:    Coronary artery disease involving native coronary artery of native heart with angina pectoris (CMS/[facility])    Anxiety    Dyslipidemia    Hypothyroidism        Status post CABG x2    Plan:  Aspirin, statin, beta-blocker  Await morning creatinine, if acceptable will give another 20 of Lasix x1  Anticipate discharge tomorrow    Ion Moody MD  01/08/21  07:31 EST
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
## 2021-01-08 NOTE — PROGRESS NOTES
[facility] Cardiology Daily Note       LOS: 3 days   Patient Care Team:  Elaine Marcos MD as PCP - General  Elaine Marcos MD as PCP - Family Medicine  RomeroFatoumata oneil MD as Consulting Physician (Cardiology)    Chief Complaint: Hyperlipidemia/hypotension    Subjective     Subjective: Overall feeling fairly good. Remains on oxygen and is 94% saturated on 2 L. He has been intermittently nauseated and is not eating very much. Her blood pressure has been relatively good on the midodrine. Unfortunately she has also been getting metoprolol.     Review of Systems:   As above.    Medications:  aspirin, 325 mg, Oral, Daily  atorvastatin, 40 mg, Oral, Nightly  insulin lispro, 0-7 Units, Subcutaneous, TID AC  levothyroxine, 25 mcg, Oral, Q AM  metoprolol tartrate, 12.5 mg, Oral, Q12H  midodrine, 10 mg, Oral, Q8H  pharmacy consult - [place], , Does not apply, Daily  senna-docusate sodium, 2 tablet, Oral, BID  sodium chloride, 10 mL, Intravenous, Q12H  sodium chloride, 10 mL, Intravenous, Q8H        Objective     Vital Sign Min/Max for last 24 hours  Temp  Min: 97.9 °F (36.6 °C)  Max: 98.5 °F (36.9 °C)   BP  Min: 100/59  Max: 116/55   Pulse  Min: 72  Max: 97   Resp  Min: 16  Max: 20   SpO2  Min: 85 %  Max: 99 %   Flow (L/min)  Min: 2  Max: 2   Weight  Min: 72 kg (158 lb 12.8 oz)  Max: 72 kg (158 lb 12.8 oz)      Intake/Output Summary (Last 24 hours) at 1/8/2021 0759  Last data filed at 1/7/2021 1400  Gross per 24 hour   Intake 120 ml   Output 835 ml   Net -715 ml            Physical Exam:    General: Alert and oriented   Cardiovascular: Heart has a nondisplaced focal PMI. Regular rate and rhythm without murmur, gallop or rub.  Lungs: Clear without rales or wheezes. Equal expansion is noted.   Extremities: Show no edema.  Skin: warm and dry.  Neurologic: nonfocal       Results Review:    I reviewed the patient's new clinical results.  EKG:  Tele: Sinus rhythm    Labs:    Results from last 7 days    Lab Units 01/08/21  0538 01/07/21  0357 01/06/21  0324  01/04/21  1515   SODIUM mmol/L 138 132* 139   < > 139   POTASSIUM mmol/L 3.7 4.6 3.8   < > 3.6   CHLORIDE mmol/L 98 97* 102   < > 100   CO2 mmol/L 30.0* 30.0* 28.0   < > 29.0   BUN mg/dL 20 12 12   < > 22   CREATININE mg/dL 0.70 0.62 0.80   < > 0.85   CALCIUM mg/dL 9.1 9.1 8.5*   < > 9.9   BILIRUBIN mg/dL  --   --   --   --  0.5   ALK PHOS U/L  --   --   --   --  106   ALT (SGPT) U/L  --   --   --   --  16   AST (SGOT) U/L  --   --   --   --  20   GLUCOSE mg/dL 105* 120* 116*   < > 115*    < > = values in this interval not displayed.     Results from last 7 days   Lab Units 01/08/21  0538 01/07/21  0357 01/06/21  0324   WBC 10*3/mm3 8.65 10.89* 11.30*   HEMOGLOBIN g/dL 8.9* 9.2* 8.8*   HEMATOCRIT % 28.1* 29.3* 27.5*   PLATELETS 10*3/mm3 153 131* 166     No results found for: TROPONINI, TROPONINT  Lab Results   Component Value Date    CHOL 227 (H) 12/18/2020     Lab Results   Component Value Date    TRIG 156 (H) 12/18/2020     Lab Results   Component Value Date    HDL 48 12/18/2020     No components found for: LDLCALC  Lab Results   Component Value Date    INR 1.47 (H) 01/06/2021    INR 1.61 (H) 01/05/2021    INR 1.61 (H) 01/05/2021    PROTIME 17.5 (H) 01/06/2021    PROTIME 18.8 (H) 01/05/2021    PROTIME 18.9 (H) 01/05/2021         Ejection Fraction:    Assessment   Assessment:  1. Coronary artery disease  - LHC 12/28/2020: significant left main disease by IFR, non critical disease in LAD, circumflex and RCA.   - Echo 12/18/2020: EF 60%, mild AR, trace to mild MR.  - s/p 2V CABG (LIMA to LAD, SVG to OM1), Dr. Moody, 1/5/2020    2.  Post-op Hypotension, on midodrine.     3.  Hyperlipidemia, on statin      Plan:    Pulmonary toilet  Discontinue metoprolol due to hypotension  Continue midodrine at 10 mg every 8 hours and plan to wean following discharge  Continue efforts at weaning oxygen  Continue aspirin and statin for coronary disease  Home over the next 24  to 48 hours.  Follow-up with me in 1 month.    Electronically signed by LAUREN Marion, 01/08/21, 7:59 AM EST.    I have seen and examined the patient, case was discussed with the physician extender, reviewed the above note, necessary changes were made and I agree with the final note.  Fatoumata Jasso MD, FACC

## 2021-01-08 NOTE — PLAN OF CARE
Goal Outcome Evaluation:  Plan of Care Reviewed With: patient  Progress: improving  Outcome Summary: VSS on 2L, pt slept with prn pain med given, c/o nausea x 1, will continue to monitor

## 2021-01-08 NOTE — PLAN OF CARE
Goal Outcome Evaluation:  Plan of Care Reviewed With: patient  Progress: improving  Outcome Summary: Pt ambulated 1000 ft with SBA and no AD. Good safety awareness, no LOB. Improved endurance. Pt met all PT goals, discontinue IP PT. Recommend home with assist when medically ready.

## 2021-01-09 VITALS
DIASTOLIC BLOOD PRESSURE: 64 MMHG | TEMPERATURE: 98.6 F | HEART RATE: 88 BPM | RESPIRATION RATE: 18 BRPM | WEIGHT: 157.4 LBS | BODY MASS INDEX: 27.44 KG/M2 | SYSTOLIC BLOOD PRESSURE: 118 MMHG | OXYGEN SATURATION: 90 %

## 2021-01-09 LAB
ANION GAP SERPL CALCULATED.3IONS-SCNC: 10 MMOL/L (ref 5–15)
BUN SERPL-MCNC: 23 MG/DL (ref 8–23)
BUN/CREAT SERPL: 31.9 (ref 7–25)
CALCIUM SPEC-SCNC: 9.4 MG/DL (ref 8.6–10.5)
CHLORIDE SERPL-SCNC: 99 MMOL/L (ref 98–107)
CO2 SERPL-SCNC: 33 MMOL/L (ref 22–29)
CREAT SERPL-MCNC: 0.72 MG/DL (ref 0.57–1)
GFR SERPL CREATININE-BSD FRML MDRD: 81 ML/MIN/1.73
GLUCOSE BLDC GLUCOMTR-MCNC: 105 MG/DL (ref 70–130)
GLUCOSE BLDC GLUCOMTR-MCNC: 119 MG/DL (ref 70–130)
GLUCOSE SERPL-MCNC: 104 MG/DL (ref 65–99)
HCT VFR BLD AUTO: 28.4 % (ref 34–46.6)
HGB BLD-MCNC: 9 G/DL (ref 12–15.9)
POTASSIUM SERPL-SCNC: 3.5 MMOL/L (ref 3.5–5.2)
SODIUM SERPL-SCNC: 142 MMOL/L (ref 136–145)

## 2021-01-09 PROCEDURE — 85014 HEMATOCRIT: CPT | Performed by: PHYSICIAN ASSISTANT

## 2021-01-09 PROCEDURE — 82962 GLUCOSE BLOOD TEST: CPT

## 2021-01-09 PROCEDURE — 85018 HEMOGLOBIN: CPT | Performed by: PHYSICIAN ASSISTANT

## 2021-01-09 PROCEDURE — 99232 SBSQ HOSP IP/OBS MODERATE 35: CPT | Performed by: INTERNAL MEDICINE

## 2021-01-09 PROCEDURE — 80048 BASIC METABOLIC PNL TOTAL CA: CPT | Performed by: PHYSICIAN ASSISTANT

## 2021-01-09 RX ORDER — BISACODYL 10 MG
10 SUPPOSITORY, RECTAL RECTAL DAILY
Status: DISCONTINUED | OUTPATIENT
Start: 2021-01-09 | End: 2021-01-09 | Stop reason: HOSPADM

## 2021-01-09 RX ORDER — HYDROCODONE BITARTRATE AND ACETAMINOPHEN 7.5; 325 MG/1; MG/1
1 TABLET ORAL EVERY 4 HOURS PRN
Qty: 30 TABLET | Refills: 0 | Status: SHIPPED | OUTPATIENT
Start: 2021-01-09 | End: 2021-01-15

## 2021-01-09 RX ORDER — MIDODRINE HYDROCHLORIDE 5 MG/1
5 TABLET ORAL EVERY 8 HOURS SCHEDULED
Qty: 90 TABLET | Refills: 3 | Status: SHIPPED | OUTPATIENT
Start: 2021-01-09 | End: 2022-02-28 | Stop reason: SDUPTHER

## 2021-01-09 RX ORDER — HYDROCODONE BITARTRATE AND ACETAMINOPHEN 7.5; 325 MG/1; MG/1
1 TABLET ORAL EVERY 4 HOURS PRN
Qty: 30 TABLET | Refills: 0 | Status: SHIPPED | OUTPATIENT
Start: 2021-01-09 | End: 2021-01-09

## 2021-01-09 RX ORDER — BISACODYL 5 MG/1
10 TABLET, DELAYED RELEASE ORAL DAILY PRN
Status: DISCONTINUED | OUTPATIENT
Start: 2021-01-09 | End: 2021-01-09 | Stop reason: HOSPADM

## 2021-01-09 RX ORDER — GUAIFENESIN 600 MG/1
600 TABLET, EXTENDED RELEASE ORAL EVERY 12 HOURS SCHEDULED
Status: DISCONTINUED | OUTPATIENT
Start: 2021-01-09 | End: 2021-01-09

## 2021-01-09 RX ADMIN — MIDODRINE HYDROCHLORIDE 10 MG: 5 TABLET ORAL at 13:33

## 2021-01-09 RX ADMIN — ACETAMINOPHEN 650 MG: 325 TABLET, FILM COATED ORAL at 13:33

## 2021-01-09 RX ADMIN — SODIUM CHLORIDE, PRESERVATIVE FREE 10 ML: 5 INJECTION INTRAVENOUS at 06:48

## 2021-01-09 RX ADMIN — MIDODRINE HYDROCHLORIDE 10 MG: 5 TABLET ORAL at 06:47

## 2021-01-09 RX ADMIN — BISACODYL 10 MG: 5 TABLET, COATED ORAL at 06:47

## 2021-01-09 RX ADMIN — SODIUM CHLORIDE, PRESERVATIVE FREE 10 ML: 5 INJECTION INTRAVENOUS at 08:48

## 2021-01-09 RX ADMIN — ASPIRIN 81 MG: 81 TABLET, COATED ORAL at 08:48

## 2021-01-09 RX ADMIN — BISACODYL 10 MG: 10 SUPPOSITORY RECTAL at 08:48

## 2021-01-09 RX ADMIN — ACETAMINOPHEN 650 MG: 325 TABLET, FILM COATED ORAL at 09:18

## 2021-01-09 RX ADMIN — DOCUSATE SODIUM 50MG AND SENNOSIDES 8.6MG 2 TABLET: 8.6; 5 TABLET, FILM COATED ORAL at 08:48

## 2021-01-09 RX ADMIN — LEVOTHYROXINE SODIUM 25 MCG: 25 TABLET ORAL at 06:47

## 2021-01-09 NOTE — PROGRESS NOTES
[facility]   Inpatient Progress Note       LOS: 4 days   Patient Care Team:  Elaine Marcos MD as PCP - General  Elaine Marcos MD as PCP - Family Medicine  Fatoumata Jasso MD as Consulting Physician (Cardiology)    Chief Complaint:  Follow-up dyslipidemia and hypotension    Subjective     Interval History:   Patient sitting up in chair.  No specific cardiac complaints.  Notes that she has been ambulating without oxygen.  Wants to go home.  Has not been getting beta-blocker dose.  Currently still getting midodrine.  Overall doing very well      Review of Systems:   Pertinent positives noted in history, exam, and assessment. Otherwise reviewed and negative.      Objective     Vitals:  Blood pressure 109/61, pulse 96, temperature 98.6 °F (37 °C), temperature source Oral, resp. rate 18, weight 71.4 kg (157 lb 6.4 oz), SpO2 91 %.     Intake/Output Summary (Last 24 hours) at 1/9/2021 1051  Last data filed at 1/9/2021 0343  Gross per 24 hour   Intake 240 ml   Output 425 ml   Net -185 ml     Vitals signs reviewed.   Constitutional:       Appearance: Well-developed.   Neck:      Vascular: No JVD.      Trachea: No tracheal deviation.   Pulmonary:      Effort: Pulmonary effort is normal.      Comments: Decreased bases bilaterally  Cardiovascular:      Normal rate. Regular rhythm.   Pulses:     Intact distal pulses.   Edema:     Peripheral edema absent.   Abdominal:      General: Bowel sounds are normal.      Palpations: Abdomen is soft.      Tenderness: There is no abdominal tenderness.   Musculoskeletal:         General: No deformity.   Skin:     General: Skin is warm and dry.   Neurological:      Mental Status: Alert and oriented to person, place, and time.     I have examined the patient and agree with the above         Results Review:     I reviewed the patient's new clinical results.    Results from last 7 days   Lab Units 01/09/21  0517 01/08/21  0538   WBC 10*3/mm3  --   8.65   HEMOGLOBIN g/dL 9.0* 8.9*   HEMATOCRIT % 28.4* 28.1*   PLATELETS 10*3/mm3  --  153     Results from last 7 days   Lab Units 01/09/21  0517  01/04/21  1515   SODIUM mmol/L 142   < > 139   POTASSIUM mmol/L 3.5   < > 3.6   CHLORIDE mmol/L 99   < > 100   CO2 mmol/L 33.0*   < > 29.0   BUN mg/dL 23   < > 22   CREATININE mg/dL 0.72   < > 0.85   CALCIUM mg/dL 9.4   < > 9.9   BILIRUBIN mg/dL  --   --  0.5   ALK PHOS U/L  --   --  106   ALT (SGPT) U/L  --   --  16   AST (SGOT) U/L  --   --  20   GLUCOSE mg/dL 104*   < > 115*    < > = values in this interval not displayed.     Results from last 7 days   Lab Units 01/09/21  0517   SODIUM mmol/L 142   POTASSIUM mmol/L 3.5   CHLORIDE mmol/L 99   CO2 mmol/L 33.0*   BUN mg/dL 23   CREATININE mg/dL 0.72   GLUCOSE mg/dL 104*   CALCIUM mg/dL 9.4     Results from last 7 days   Lab Units 01/06/21  0324 01/05/21  1103 01/05/21  1016   INR  1.47* 1.61* 1.61*     No results found for: TROPONINI                  Tele: Sinus rhythm    Assessment/Plan       Coronary artery disease involving native coronary artery of native heart with angina pectoris (CMS/LTAC, located within [facility])    Anxiety    Dyslipidemia    Hypothyroidism      Assessment:  1. Coronary artery disease  - C 12/28/2020: significant left main disease by IFR, non critical disease in LAD, circumflex and RCA.   - Echo 12/18/2020: EF 60%, mild AR, trace to mild MR.  - s/p 2V CABG (LIMA to LAD, SVG to OM1), Dr. Moody, 1/5/2020     2.  Post-op Hypotension, on midodrine.      3.  Hyperlipidemia, on statin    Plan:  · Discontinue Lopressor as patient currently not receiving this medication.  · Continue midodrine as outpatient.    LAUREN Hope   Dictated utilizing Dragon dictation  I have seen and examined the patient, I have reviewed the note, discussed the case with the advance practice clinician, made necessary changes and I agree with the final note.    Alonzo Sow MD  01/09/21  11:29 EST

## 2021-01-09 NOTE — PROGRESS NOTES
[facility]  PROGRESS NOTE    Patient Name: Preeti Guerra  : 1955  MRN: [id number]    Date of Admission: 2021  Primary Care Physician: Elaine Marcos MD    Subjective   Subjective     CC:  F/u CABG    HPI:  Patient complains of a little pain in her surgical site.     ROS:  Gen- No fevers, chills  CV- No chest pain, palpitations  Resp- No cough, dyspnea  GI- No N/V/D, abd pain        Objective   Objective     Vital Signs:   Temp:  [98 °F (36.7 °C)-98.3 °F (36.8 °C)] 98 °F (36.7 °C)  Heart Rate:  [68-97] 92  Resp:  [18] 18  BP: (100-113)/(59-67) 107/60        Physical Exam:  Constitutional: No acute distress, awake, alert, female sitting up in chair   HENT: NCAT, mucous membranes moist  Respiratory: diminished at the bases , respiratory effort normal on 2L NC  Cardiovascular: RRR, no murmurs, rubs, or gallops  Gastrointestinal: Positive bowel sounds, soft, nontender, nondistended  Musculoskeletal: No bilateral ankle edema  Psychiatric: Appropriate affect, cooperative  Neurologic: Oriented x 3, strength symmetric in all extremities, speech clear  Skin: No rashes, surgical site without erythema or drainage       Results Reviewed:  Results from last 7 days   Lab Units 21  0538 21  0357 21  0324  21  1103 21  1016   WBC 10*3/mm3 8.65 10.89* 11.30*   < > 6.13 5.36   HEMOGLOBIN g/dL 8.9* 9.2* 8.8*   < > 9.0* 8.7*   HEMATOCRIT % 28.1* 29.3* 27.5*   < > 27.4* 26.8*   PLATELETS 10*3/mm3 153 131* 166   < > 116* 119*   INR   --   --  1.47*  --  1.61* 1.61*    < > = values in this interval not displayed.     Results from last 7 days   Lab Units 21  0538 21  0357 21  0324  21  1515   SODIUM mmol/L 138 132* 139   < > 139   POTASSIUM mmol/L 3.7 4.6 3.8   < > 3.6   CHLORIDE mmol/L 98 97* 102   < > 100   CO2 mmol/L 30.0* 30.0* 28.0   < > 29.0   BUN mg/dL 20 12 12   < > 22   CREATININE mg/dL 0.70 0.62 0.80   < >  0.85   GLUCOSE mg/dL 105* 120* 116*   < > 115*   CALCIUM mg/dL 9.1 9.1 8.5*   < > 9.9   ALT (SGPT) U/L  --   --   --   --  16   AST (SGOT) U/L  --   --   --   --  20    < > = values in this interval not displayed.     Estimated Creatinine Clearance: 66.6 mL/min (by C-G formula based on SCr of 0.7 mg/dL).    Microbiology Results Abnormal     None          Imaging Results (Last 24 Hours)     Procedure Component Value Units Date/Time    XR Chest 1 View [[id number]] Collected: 01/07/21 0831     Updated: 01/07/21 2210    Narrative:      EXAMINATION: XR CHEST 1 VW-01/07/2021:     INDICATION: Post-Op Heart Surgery; I25.119-Atherosclerotic heart disease  of native coronary artery with unspecified angina pectoris.     COMPARISON: 01/06/2021.     FINDINGS: The heart is normal in size. The vasculature appears normal.  Left basilar atelectasis has improved. No pneumothorax is seen.       Impression:      Mildly improved left basilar atelectasis. No new chest  disease is seen.     D:  01/07/2021  E:  01/07/2021            This report was finalized on 1/7/2021 10:07 PM by Dr. Silvino Melendez MD.             Results for orders placed in visit on 01/05/21   Emergent/Open-Heart Anesthesia ROCÍO    Narrative Jitendra Simpson MD     1/5/2021  8:16 AM  Procedure Performed: Emergent/Open-Heart Anesthesia ROCÍO     Start Time:        End Time:      Preanesthesia Checklist:  Patient identified, IV assessed, risks and benefits discussed, monitors   and equipment assessed, procedure being performed at surgeon's request and   anesthesia consent obtained.    General Procedure Information  Diagnostic Indications for Echo:  assessment of ascending aorta,   assessment of surgical repair and hemodynamic monitoring  Physician Requesting Echo: Ion Moody MD  Location performed:  OR  Intubated  Bite block not placed  Heart visualized  Probe Insertion:  Easy  Probe Type:  Multiplane  Modalities:  Color flow mapping, continuous wave Doppler and pulse  wave   Doppler    Echocardiographic and Doppler Measurements    Ventricles    Right Ventricle:  Hypertrophy not present.  Thrombus not present.  Global function normal.    Left Ventricle:  Cavity size normal.  Hypertrophy not present.  Thrombus not present.    Global Function normal.  Ejection Fraction 55%.          Valves    Aortic Valve:  Annulus normal.  Stenosis not present.  Area: 2.7 cm².  Mean Gradient: 2   mean 1 mmHg.  Pressure Half-time: 833 ms.  Regurgitation mild.  Leaflets   normal.  Leaflet motions normal.      Mitral Valve:  Annulus normal.  Stenosis not present.  Area: 2.8 cm².  Mean Gradient: 1   mmHg.  Vena Contracta Width: 0.18 cm.  Regurgitation mild.  Leaflets   normal.  Leaflet motions normal.      Tricuspid Valve:  Annulus normal.  Stenosis not present.  Regurgitation trace.  Leaflets   normal.  Leaflet motions normal.    Pulmonic Valve:  Annulus normal.  Regurgitation trace.          Aorta    Ascending Aorta:  Size normal.  Diameter 2.8 cm.  Dissection not present.  Plaque thickness   less than 3 mm.  Mobile plaque not present.    Aortic Arch:  Size normal.  Dissection not present.  Plaque thickness less than 3 mm.    Mobile plaque not present.    Descending Aorta:  Size normal.  Dissection not present.  Plaque thickness less than 3 mm.    Mobile plaque not present.          Atria    Right Atrium:  Size normal.  Spontaneous echo contrast not present.  Thrombus not   present.  Tumor not present.  Device present.    Left Atrium:  Size normal.  Spontaneous echo contrast not present.  Thrombus not   present.  Tumor not present.  Left atrial appendage normal.      Septa    Atrial Septum:  Intra-atrial septal morphology normal.      Ventricular Septum:  Intra-ventricular septum morphology normal.      Diastolic Function Measurements:  Diastolic Dysfunction Grade=  E= ms  A= ms  E/A Ratio= 0.7  DT= ms  S/D= 1.6  IVRT=    Other Findings  Pericardium:  normal  Pleural Effusion:  none  Pulmonary  Arteries:  normal  Pulmonary Venous Flow:  normal    Anesthesia Information  Performed Personally  Anesthesiologist:  Jitendra Simpson MD      Echocardiogram Comments:       Informed consent was obtained preoperatively.  Tomas probe passed   without difficulty.  Post CABG:       I have reviewed the medications:  Scheduled Meds:[START ON 1/9/2021] aspirin, 81 mg, Oral, Daily  insulin lispro, 0-7 Units, Subcutaneous, TID AC  levothyroxine, 25 mcg, Oral, Q AM  metoprolol tartrate, 12.5 mg, Oral, Q12H  midodrine, 10 mg, Oral, Q8H  pharmacy consult - MTM, , Does not apply, Daily  rosuvastatin, 10 mg, Oral, Nightly  senna-docusate sodium, 2 tablet, Oral, BID  sodium chloride, 10 mL, Intravenous, Q12H  sodium chloride, 10 mL, Intravenous, Q8H      Continuous Infusions:   PRN Meds:.•  acetaminophen  •  dextrose  •  dextrose  •  glucagon (human recombinant)  •  HYDROcodone-acetaminophen  •  Morphine  •  naloxone  •  ondansetron  •  oxyCODONE-acetaminophen  •  prochlorperazine    Assessment/Plan   Assessment & Plan     Active Hospital Problems    Diagnosis  POA   • **Coronary artery disease involving native coronary artery of native heart with angina pectoris (CMS/[facility]) [I25.119]  Yes   • Hypothyroidism [E03.9]  Yes   • Anxiety [F41.9]  Yes   • Dyslipidemia [E78.5]  Yes      Resolved Hospital Problems   No resolved problems to display.        Brief Hospital Course to date:  Preeti Guerra is a 65 y.o. female with PMH of HLD, hypothyroidism and anxiety underwent CABG by dr Moody on 1/5/2021     MV CAD s/p CABG  -Management per CT surgery  -cardiology following   -ASA statin   Acute Respiratory Failure  -currently on 2L NC  -attempt to wean  Hypotension  -holding metoprolol   -midodrine, cards plans on weaning after discharge  HLD  Hypothyroidism  Anxiety       DVT Prophylaxis:  Mechanical       Disposition: I expect the patient to be discharged possibly tomorrow .    CODE STATUS:   Code Status and Medical Interventions:    Ordered at: 01/05/21 1051     Code Status:    CPR     Medical Interventions (Level of Support Prior to Arrest):    Full       Wendy Dickinson,   01/08/21

## 2021-01-09 NOTE — PROGRESS NOTES
Cardiothoracic Surgery Progress Note      POD #4 s/p CABG x2       LOS: 4 days      Subjective:    Has not had a bowel movement yet   denies chest pain denies shortness of breath      Objective:  Vital Signs  Temp:  [98.3 °F (36.8 °C)-98.6 °F (37 °C)] 98.6 °F (37 °C)  Heart Rate:  [70-99] 80  Resp:  [18] 18  BP: (101-111)/(50-69) 109/61    Physical Exam:   General Appearance: alert, appears stated age and cooperative   Lungs: clear to auscultation, respirations regular, respirations even and respirations unlabored   Heart: regular rhythm & normal rate, normal S1, S2, no murmur, no gallop, no rub and no click   Skin: Incision c/d/i     Results:    Results from last 7 days   Lab Units 01/09/21  0517 01/08/21  0538   WBC 10*3/mm3  --  8.65   HEMOGLOBIN g/dL 9.0* 8.9*   HEMATOCRIT % 28.4* 28.1*   PLATELETS 10*3/mm3  --  153     Results from last 7 days   Lab Units 01/09/21  0517   SODIUM mmol/L 142   POTASSIUM mmol/L 3.5   CHLORIDE mmol/L 99   CO2 mmol/L 33.0*   BUN mg/dL 23   CREATININE mg/dL 0.72   GLUCOSE mg/dL 104*   CALCIUM mg/dL 9.4         Assessment:    Coronary artery disease involving native coronary artery of native heart with angina pectoris (CMS/[facility])    Anxiety    Dyslipidemia    Hypothyroidism      Creatinine stable  Status post CABG x2    Plan:  Aspirin, statin, beta-blocker  Await morning creatinine, if acceptable will give another 20 of Lasix x1  Possible discharge    Jerson Schmid PA-C  01/09/21  08:41 EST

## 2021-01-15 ENCOUNTER — TELEMEDICINE (OUTPATIENT)
Dept: CARDIOLOGY | Facility: HOSPITAL | Age: 66
End: 2021-01-15

## 2021-01-15 VITALS
BODY MASS INDEX: 26.86 KG/M2 | SYSTOLIC BLOOD PRESSURE: 115 MMHG | DIASTOLIC BLOOD PRESSURE: 74 MMHG | HEART RATE: 72 BPM | HEIGHT: 63 IN | WEIGHT: 151.6 LBS

## 2021-01-15 DIAGNOSIS — E78.2 MIXED HYPERLIPIDEMIA: ICD-10-CM

## 2021-01-15 DIAGNOSIS — I25.118 CORONARY ARTERY DISEASE OF NATIVE ARTERY OF NATIVE HEART WITH STABLE ANGINA PECTORIS (HCC): Primary | ICD-10-CM

## 2021-01-15 DIAGNOSIS — Z95.1 S/P CABG (CORONARY ARTERY BYPASS GRAFT): ICD-10-CM

## 2021-01-15 DIAGNOSIS — I95.9 HYPOTENSION, UNSPECIFIED HYPOTENSION TYPE: ICD-10-CM

## 2021-01-15 PROCEDURE — 99214 OFFICE O/P EST MOD 30 MIN: CPT | Performed by: NURSE PRACTITIONER

## 2021-01-15 NOTE — PROGRESS NOTES
"[facility]    Chief Complaint  Coronary Artery Disease (hospital f/u s/p CABG)    This was an audio and video enabled telemedicine encounter.    Subjective    History of Present Illness {CC  Problem List  Visit  Diagnosis   Encounters  Notes  Medications  Labs  Result Review Imaging  Media :23}     Preeti Guerra presents to [facility] for   History of Present Illness     65-year-old female admitted to [facility] for coronary artery disease status post CABG on 1/5/2020.  She developed postop hypotension on midodrine.    Pt reports that she is doing well post discharge.  She is noted increased activity tolerance.  Eating well.  Sleeping well at night.  Incisional chest discomfort is well managed.  She denies dyspnea, dizziness, near-syncope, syncope, lower extremity edema, palpitations.  Initially she noted drainage from her mediastinal incision sites that has now resolved.  Reports that incisions are approximated, no erythema or edema.  Denies nausea, vomiting, abdominal pain, hematuria, dysuria      Objective     Vital Signs:   Vitals:    01/15/21 1416   BP: 115/74   Pulse: 72   Weight: 68.8 kg (151 lb 9.6 oz)   Height: 160 cm (63\")     Body mass index is 26.85 kg/m².  Physical Exam  Vitals signs reviewed.   Constitutional:       General: She is not in acute distress.  Pulmonary:      Effort: Pulmonary effort is normal.   Skin:     Coloration: Skin is not pale.   Neurological:      Mental Status: She is alert.   Psychiatric:         Mood and Affect: Mood normal.         Behavior: Behavior normal. Behavior is cooperative.              Result Review  Data Reviewed:{ Labs  Result Review  Imaging  Med Tab  Media :23}     Lab Results   Component Value Date    GLUCOSE 104 (H) 01/09/2021    BUN 23 01/09/2021    CREATININE 0.72 01/09/2021    EGFRIFNONA 81 01/09/2021    BCR 31.9 (H) 01/09/2021    K 3.5 " 01/09/2021    CO2 33.0 (H) 01/09/2021    CALCIUM 9.4 01/09/2021    ALBUMIN 4.50 01/06/2021    AST 20 01/04/2021    ALT 16 01/04/2021     Lab Results   Component Value Date    TSH 3.141 05/09/2016     Lab Results   Component Value Date    GLUCOSE 104 (H) 01/09/2021    BUN 23 01/09/2021    CREATININE 0.72 01/09/2021    EGFRIFNONA 81 01/09/2021    BCR 31.9 (H) 01/09/2021    K 3.5 01/09/2021    CO2 33.0 (H) 01/09/2021    CALCIUM 9.4 01/09/2021    ALBUMIN 4.50 01/06/2021    AST 20 01/04/2021    ALT 16 01/04/2021     Lab Results   Component Value Date    CHOL 227 (H) 12/18/2020     Lab Results   Component Value Date    TRIG 156 (H) 12/18/2020     Lab Results   Component Value Date    HDL 48 12/18/2020     Lab Results   Component Value Date     (H) 12/18/2020       Assessment and Plan {CC Problem List  Visit Diagnosis  ROS  Review (Popup)  Health Maintenance  Quality  BestPractice  Medications  SmartSets  SnapShot Encounters  Media :23}   1. Coronary artery disease of native artery of native heart with stable angina pectoris (CMS/[facility])  Asa, statin    2. S/P CABG (coronary artery bypass graft)  No reported s/s of infection  Improving   Discussed post procedural expectations, management, wound care, activity restrictions, s/s of infection and when to call    3. Hypotension, unspecified hypotension type  No s/s of hypotension  's  Continue current meds and BP log    4. Mixed hyperlipidemia  Statin, no concerns or myalgias.            Follow Up {Instructions Charge Capture  Follow-up Communications :23}   Return if symptoms worsen or fail to improve.    F/u with [facility] and CT sx as scheduled.  F/u H&V Center as needed  Discussed role of H&V center.       Patient was given instructions and counseling regarding her condition or for health maintenance advice. Please see specific information pulled into the AVS if appropriate.  Patient was instructed to call the [facility] with any questions,  concerns, or worsening symptoms.    *Please note that portions of this note were completed with a voice recognition program. Efforts were made to edit the dictations, but occasionally words are mistranscribed.

## 2021-01-16 NOTE — DISCHARGE SUMMARY
CTS Discharge Summary    Patient Care Team:  Elaine Marcos MD as PCP - General  Elaine Marcos MD as PCP - Family Medicine  Fatoumata Jasso MD as Consulting Physician (Cardiology)      Date of Admission: 1/5/2021  5:34 AM  Date of Discharge: 1/9/2021    Discharge Diagnosis  Past Medical History:   Diagnosis Date   • Celiac disease    • Coronary artery disease     one vessel   • Hypothyroidism    • Interstitial cystitis    • Menopause    • Mumps    • Phlebitis    • PONV (postoperative nausea and vomiting)    • Wears glasses          Coronary artery disease involving native coronary artery of native heart with angina pectoris (CMS/HCC)    Anxiety    Dyslipidemia    Hypothyroidism      History of Present IllnessDelightful 65-year-old female who was undergoing a pedicure and developed chest pain that radiated into her jaw.  This lasted about 4 minutes and went away on its own.  She has noticed some decreased stamina and dyspnea on exertion.  This prompted her to seek evaluation by her cardiologist.  She was seen by Dr. Jasso in September with a stress test which was positive.  She underwent left heart catheterization today, December 18, and was found to have a 60% left main with a preserved ejection fraction.  She is otherwise recovering well from the heart catheterization and has no current chest pain.  She did have a history of syncope in 2016 where she was worked up with an echo and carotid duplexes.  She has not had syncope since then.  She does have a history of Crohn's disease as well as hemorrhagic cystitis.  Preop echocardiogram showed an EF of 60% with mild AR and a trace to mild MR      Hospital Course  Patient is a 65 y.o. female was admitted and underwent coronary artery bypass grafting x2 with endoscopic vein harvesting the right greater saphenous vein  Postop cardiology was consulted and started on aspirin and statins and her blood pressure was too low postop for  beta-blockers    He was able to be extubated   have her chest tubes and pacing wires removed the second postop day  She was still unable to take beta-blockers were started on midodrine  Her activities increased her diet increased and she was able to be discharged home                Procedures Performed  Procedure(s):  CORONARY ARTERY BYPASS GRAFTING x 2 WITH INTERNAL MAMMARY ARTERY GRAFT, EVH FROM RIGHT GREATER SAPHENOUS VEIN, ROCÍO       Consults:   Consults     Date and Time Order Name Status Description    1/5/2021 1052 Inpatient Consult to Cardiology Completed             Discharge Medications     Discharge Medications      New Medications      Instructions Start Date   midodrine 5 MG tablet  Commonly known as: PROAMATINE   5 mg, Oral, Every 8 Hours Scheduled         Changes to Medications      Instructions Start Date   estradiol 0.1 MG/GM vaginal cream  Commonly known as: ESTRACE  What changed:   · how much to take  · when to take this   2 g, Vaginal, Daily         Continue These Medications      Instructions Start Date   aspirin tablet   81 mg, Oral, Daily      coenzyme Q10 100 MG capsule   100 mg, Oral, Daily      ELDERBERRY PO   Oral, Daily      Elmiron 100 MG capsule  Generic drug: pentosan polysulfate   1 capsule, Oral, 2 Times Daily      hydroCHLOROthiazide 25 MG tablet  Commonly known as: HYDRODIURIL   25 mg, Oral, Daily PRN      ketoconazole 2 % shampoo  Commonly known as: NIZORAL   Topical      levothyroxine 25 MCG tablet  Commonly known as: SYNTHROID, LEVOTHROID   25 mcg, Oral, Daily      rosuvastatin 10 MG tablet  Commonly known as: CRESTOR   10 mg, Oral, Daily      Vitamin D 50 MCG (2000 UT) capsule   2,000 Units, Oral, Daily      Zinc 30 MG tablet   Oral         ASK your doctor about these medications      Instructions Start Date   HYDROcodone-acetaminophen 7.5-325 MG per tablet  Commonly known as: NORCO  Ask about: Should I take this medication?   1 tablet, Oral, Every 4 Hours PRN              Discharge Diet: Cardiac    Activity at Discharge:   Do not drive while taking narcotics    Follow-up Appointments  Future Appointments   Date Time Provider Department Center   1/27/2021  1:00 PM Fatoumata Jasso MD [facility] None   1/28/2021 11:00 AM Ion Moody MD [facility] None   3/23/2021  9:30 AM [facility] SCREENING [facility]   3/23/2021 11:00 AM Lincoln More MD [facility]  [facility]          Jerson Schmid PA-C  01/16/21  09:03 EST

## 2021-01-27 ENCOUNTER — OFFICE VISIT (OUTPATIENT)
Dept: CARDIOLOGY | Facility: CLINIC | Age: 66
End: 2021-01-27

## 2021-01-27 VITALS
BODY MASS INDEX: 26.12 KG/M2 | OXYGEN SATURATION: 98 % | WEIGHT: 153 LBS | DIASTOLIC BLOOD PRESSURE: 68 MMHG | HEIGHT: 64 IN | HEART RATE: 86 BPM | SYSTOLIC BLOOD PRESSURE: 118 MMHG

## 2021-01-27 DIAGNOSIS — E78.5 DYSLIPIDEMIA: ICD-10-CM

## 2021-01-27 DIAGNOSIS — Z95.1 S/P CABG (CORONARY ARTERY BYPASS GRAFT): ICD-10-CM

## 2021-01-27 DIAGNOSIS — I25.119 CORONARY ARTERY DISEASE INVOLVING NATIVE CORONARY ARTERY OF NATIVE HEART WITH ANGINA PECTORIS (HCC): Primary | ICD-10-CM

## 2021-01-27 PROCEDURE — 99214 OFFICE O/P EST MOD 30 MIN: CPT | Performed by: INTERNAL MEDICINE
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
## 2021-01-28 ENCOUNTER — OFFICE VISIT (OUTPATIENT)
Dept: CARDIAC SURGERY | Facility: CLINIC | Age: 66
End: 2021-01-28

## 2021-01-28 VITALS
TEMPERATURE: 97.5 F | HEART RATE: 84 BPM | DIASTOLIC BLOOD PRESSURE: 70 MMHG | WEIGHT: 154 LBS | BODY MASS INDEX: 27.29 KG/M2 | HEIGHT: 63 IN | SYSTOLIC BLOOD PRESSURE: 110 MMHG | OXYGEN SATURATION: 96 %

## 2021-01-28 DIAGNOSIS — I25.119 CORONARY ARTERY DISEASE INVOLVING NATIVE CORONARY ARTERY OF NATIVE HEART WITH ANGINA PECTORIS (HCC): ICD-10-CM

## 2021-01-28 PROCEDURE — 99024 POSTOP FOLLOW-UP VISIT: CPT | Performed by: STUDENT IN AN ORGANIZED HEALTH CARE EDUCATION/TRAINING PROGRAM

## 2021-01-28 PROCEDURE — 71046 X-RAY EXAM CHEST 2 VIEWS: CPT | Performed by: STUDENT IN AN ORGANIZED HEALTH CARE EDUCATION/TRAINING PROGRAM

## 2021-01-28 NOTE — PROGRESS NOTES
Date: 2021   Patient Name: Pereti Guerra  Address: [address] [address]  : 1955   Phone #: [unfilled]   MRN: [id number]     Referring Physician: No ref. provider found    Chief Complaint:    Chief Complaint   Patient presents with   • Post-op Follow-up     Hosp D/C CABG x 2 21 for CAD       History of Present Illness: Preeti Guerra is a 65 y.o. female who is here today for follow up for CABG x2 on .  She is overall doing well although still little bit tired but energy is increasing.  She has no other complaints    Review of Systems:   ROS     I have reviewed and the following portions of the patient's history were updated as appropriate: past family history, past medical history, past social history, past surgical history and problem list.    Medications:     Current Outpatient Medications:   •  Acetaminophen (TYLENOL EXTRA STRENGTH PO), Take  by mouth As Needed., Disp: , Rfl:   •  aspirin tablet, Take 81 mg by mouth Daily., Disp: , Rfl:   •  Cholecalciferol (VITAMIN D) 2000 UNITS capsule, Take 2,000 Units by mouth Daily., Disp: , Rfl:   •  ELDERBERRY PO, Take  by mouth Daily., Disp: , Rfl:   •  hydroCHLOROthiazide (HYDRODIURIL) 25 MG tablet, Take 25 mg by mouth Daily As Needed. 1/2 tablet daily, Disp: , Rfl:   •  levothyroxine (SYNTHROID, LEVOTHROID) 25 MCG tablet, Take 25 mcg by mouth Daily., Disp: , Rfl:   •  midodrine (PROAMATINE) 5 MG tablet, Take 1 tablet by mouth Every 8 (Eight) Hours. (Patient taking differently: Take 5 mg by mouth Every 8 (Eight) Hours. 1/2 tablet TID), Disp: 90 tablet, Rfl: 3  •  pentosan polysulfate (ELMIRON) 100 MG capsule, Take 1 capsule by mouth 2 (two) times a day., Disp: , Rfl:   •  rosuvastatin (CRESTOR) 10 MG tablet, Take 1 tablet by mouth Daily., Disp: 90 tablet, Rfl: 3  •  Zinc 30 MG tablet, Take  by mouth Daily., Disp: , Rfl:   •  coenzyme Q10 100 MG capsule, Take 100 mg by mouth Daily., Disp: , Rfl:   •  estradiol (ESTRACE) 0.1  "MG/GM vaginal cream, Insert 2 g into the vagina daily. (Patient taking differently: Insert 1 g into the vagina 1 (One) Time Per Week.), Disp: 42.5 g, Rfl: 5  •  ketoconazole (NIZORAL) 2 % shampoo, Apply  topically., Disp: , Rfl:     Allergies:   No Known Allergies    Physical Exam:  Vital Signs:   Vitals:    01/28/21 1117   BP: 110/70   BP Location: Left arm   Patient Position: Sitting   Pulse: 84   Temp: 97.5 °F (36.4 °C)   SpO2: 96%   Weight: 69.9 kg (154 lb)   Height: 160 cm (63\")     Body mass index is 27.28 kg/m².     Physical Exam  Vitals signs and nursing note reviewed.   Constitutional:       Appearance: She is well-developed. She is not toxic-appearing.   HENT:      Head: Normocephalic.   Eyes:      Conjunctiva/sclera: Conjunctivae normal.      Pupils: Pupils are equal, round, and reactive to light.   Neck:      Musculoskeletal: Normal range of motion.      Vascular: No carotid bruit or JVD.   Cardiovascular:      Rate and Rhythm: Normal rate and regular rhythm.      Pulses:           Carotid pulses are 2+ on the right side and 2+ on the left side.       Radial pulses are 2+ on the right side and 2+ on the left side.        Femoral pulses are 2+ on the right side and 2+ on the left side.       Popliteal pulses are 2+ on the right side and 2+ on the left side.        Dorsalis pedis pulses are 2+ on the right side and 2+ on the left side.        Posterior tibial pulses are 2+ on the right side and 2+ on the left side.      Heart sounds: Normal heart sounds. No murmur. No systolic murmur. No diastolic murmur. No friction rub. No gallop.    Pulmonary:      Effort: Pulmonary effort is normal. No respiratory distress.      Breath sounds: Normal breath sounds. No wheezing, rhonchi or rales.   Chest:      Chest wall: No tenderness.   Abdominal:      General: Bowel sounds are normal. There is no distension.      Palpations: Abdomen is soft. There is no mass.      Tenderness: There is no abdominal tenderness. There " is no guarding.   Musculoskeletal: Normal range of motion.   Lymphadenopathy:      Cervical: No cervical adenopathy.   Skin:     General: Skin is warm and dry.      Capillary Refill: Capillary refill takes less than 2 seconds.      Findings: No rash.   Neurological:      Mental Status: She is alert and oriented to person, place, and time.      Cranial Nerves: No cranial nerve deficit.   Psychiatric:         Speech: Speech normal.         Behavior: Behavior normal.         Thought Content: Thought content normal.         Judgment: Judgment normal.        Wound: Well-healed, sternum stable    Results:   I have reviewed all pertinent radiographic studies, laboratory results and/or pathology as available.   No radiology results for the last 7 days     Assessment/Plan:   Diagnoses and all orders for this visit:    1. Coronary artery disease involving native coronary artery of native heart with angina pectoris (CMS/HCC)    Overall doing well, she has a small left pleural effusion.  I have offered to see her back in a month with another x-ray.  If she is doing well she will cancel the appointment for follow-up as needed    Follow Up:   No follow-ups on file.    The ROS, past medical history, surgical history, family history, social history and vitals were reviewed by myself and corrected as needed.      Ion Moody MD  [facility] Cardiothoracic Surgery   Electronically signed by Ion Moody MD, 01/28/21, 11:54 AM EST.

## 2021-02-22 ENCOUNTER — DOCUMENTATION (OUTPATIENT)
Dept: CARDIAC REHAB | Facility: HOSPITAL | Age: 66
End: 2021-02-22

## 2021-02-22 NOTE — PROGRESS NOTES
Pt. Referred for Phase II Cardiac Rehab. Staff discussed benefits of exercise, program protocol, and educational material provided. Teach back verified.  Permission granted from patient for staff to fax referral information to outlying program at this time.  Staff faxed referral info to [facility].

## 2021-03-04 ENCOUNTER — OFFICE VISIT (OUTPATIENT)
Dept: CARDIAC SURGERY | Facility: CLINIC | Age: 66
End: 2021-03-04

## 2021-03-04 VITALS
DIASTOLIC BLOOD PRESSURE: 80 MMHG | HEIGHT: 63 IN | BODY MASS INDEX: 27.46 KG/M2 | OXYGEN SATURATION: 99 % | WEIGHT: 155 LBS | HEART RATE: 78 BPM | TEMPERATURE: 97.3 F | SYSTOLIC BLOOD PRESSURE: 120 MMHG

## 2021-03-04 DIAGNOSIS — I25.119 CORONARY ARTERY DISEASE INVOLVING NATIVE CORONARY ARTERY OF NATIVE HEART WITH ANGINA PECTORIS (HCC): ICD-10-CM

## 2021-03-04 PROCEDURE — 71046 X-RAY EXAM CHEST 2 VIEWS: CPT | Performed by: STUDENT IN AN ORGANIZED HEALTH CARE EDUCATION/TRAINING PROGRAM

## 2021-03-04 PROCEDURE — 99024 POSTOP FOLLOW-UP VISIT: CPT | Performed by: STUDENT IN AN ORGANIZED HEALTH CARE EDUCATION/TRAINING PROGRAM

## 2021-03-04 NOTE — PROGRESS NOTES
Date: 2021   Patient Name: Preeti Guerra  [facility] [address]  : 1955   Phone #: [unfilled]   MRN: [id number]     Referring Physician: No ref. provider found    Chief Complaint:    Chief Complaint   Patient presents with   • Post-op Follow-up     1 MO FU with CXR for CABG done 21   • Coronary Artery Disease       History of Present Illness: Preeti Guerra is a 65 y.o. female who is here today for follow up for 2 months post CABG overall she is doing well her energy has improved she still has some left-sided chest wall pain but overall is done much better within the past month.  Her x-ray demonstrates near resolution of the left effusion.  She is not short of breath and she says her activity is increasing.  Her biggest complaint is scar hypertrophy at the point of where her underwire bra rubs the sternal incision.  The wound is intact but the incision is a bit hypertrophic    Review of Systems:   ТАТЬЯНА     I have reviewed and the following portions of the patient's history were updated as appropriate: past family history, past medical history, past social history, past surgical history and problem list.    Medications:     Current Outpatient Medications:   •  Acetaminophen (TYLENOL EXTRA STRENGTH PO), Take  by mouth As Needed., Disp: , Rfl:   •  aspirin tablet, Take 81 mg by mouth Daily., Disp: , Rfl:   •  Cholecalciferol (VITAMIN D) 2000 UNITS capsule, Take 2,000 Units by mouth Daily., Disp: , Rfl:   •  coenzyme Q10 100 MG capsule, Take 100 mg by mouth Daily., Disp: , Rfl:   •  ELDERBERRY PO, Take  by mouth Daily., Disp: , Rfl:   •  estradiol (ESTRACE) 0.1 MG/GM vaginal cream, Insert 2 g into the vagina daily. (Patient taking differently: Insert 1 g into the vagina 1 (One) Time Per Week.), Disp: 42.5 g, Rfl: 5  •  hydroCHLOROthiazide (HYDRODIURIL) 25 MG tablet, Take 25 mg by mouth Daily As Needed. 1/2 tablet daily, Disp: , Rfl:   •  ketoconazole (NIZORAL) 2 % shampoo,  "Apply  topically., Disp: , Rfl:   •  levothyroxine (SYNTHROID, LEVOTHROID) 25 MCG tablet, Take 25 mcg by mouth Daily., Disp: , Rfl:   •  midodrine (PROAMATINE) 5 MG tablet, Take 1 tablet by mouth Every 8 (Eight) Hours. (Patient taking differently: Take 5 mg by mouth Every 8 (Eight) Hours. 1/2 tablet QD), Disp: 90 tablet, Rfl: 3  •  pentosan polysulfate (ELMIRON) 100 MG capsule, Take 1 capsule by mouth 2 (two) times a day., Disp: , Rfl:   •  rosuvastatin (CRESTOR) 10 MG tablet, Take 1 tablet by mouth Daily., Disp: 90 tablet, Rfl: 3  •  Zinc 30 MG tablet, Take  by mouth Daily., Disp: , Rfl:     Allergies:   No Known Allergies    Physical Exam:  Vital Signs:   Vitals:    03/04/21 1058   BP: 120/80   BP Location: Left arm   Patient Position: Sitting   Pulse: 78   Temp: 97.3 °F (36.3 °C)   SpO2: 99%   Weight: 70.3 kg (155 lb)   Height: 160 cm (63\")     Body mass index is 27.46 kg/m².     Physical Exam  Vitals signs and nursing note reviewed.   Constitutional:       Appearance: She is well-developed. She is not toxic-appearing.   HENT:      Head: Normocephalic.   Eyes:      Conjunctiva/sclera: Conjunctivae normal.      Pupils: Pupils are equal, round, and reactive to light.   Neck:      Musculoskeletal: Normal range of motion.      Vascular: No carotid bruit or JVD.   Cardiovascular:      Rate and Rhythm: Normal rate and regular rhythm.      Pulses:           Carotid pulses are 2+ on the right side and 2+ on the left side.       Radial pulses are 2+ on the right side and 2+ on the left side.        Femoral pulses are 2+ on the right side and 2+ on the left side.       Popliteal pulses are 2+ on the right side and 2+ on the left side.        Dorsalis pedis pulses are 2+ on the right side and 2+ on the left side.        Posterior tibial pulses are 2+ on the right side and 2+ on the left side.      Heart sounds: Normal heart sounds. No murmur. No systolic murmur. No diastolic murmur. No friction rub. No gallop.    Pulmonary: "      Effort: Pulmonary effort is normal. No respiratory distress.      Breath sounds: Normal breath sounds. No wheezing, rhonchi or rales.   Chest:      Chest wall: No tenderness.   Abdominal:      General: Bowel sounds are normal. There is no distension.      Palpations: Abdomen is soft. There is no mass.      Tenderness: There is no abdominal tenderness. There is no guarding.   Musculoskeletal: Normal range of motion.   Lymphadenopathy:      Cervical: No cervical adenopathy.   Skin:     General: Skin is warm and dry.      Capillary Refill: Capillary refill takes less than 2 seconds.      Findings: No rash.   Neurological:      Mental Status: She is alert and oriented to person, place, and time.      Cranial Nerves: No cranial nerve deficit.   Psychiatric:         Speech: Speech normal.         Behavior: Behavior normal.         Thought Content: Thought content normal.         Judgment: Judgment normal.        Wound: Sternum stable, wound healed well    Results:   I have reviewed all pertinent radiographic studies, laboratory results and/or pathology as available.   No radiology results for the last 7 days     Assessment/Plan:   Diagnoses and all orders for this visit:    1. Coronary artery disease involving native coronary artery of native heart with angina pectoris (CMS/[facility])       Overall doing well, left pleural effusion has nearly resolved can follow-up with me as needed  Can resume regular activities    Follow Up:   Return if symptoms worsen or fail to improve.    The ROS, past medical history, surgical history, family history, social history and vitals were reviewed by myself and corrected as needed.      Ion Moody MD  [facility] Cardiothoracic Surgery   Electronically signed by Ion Moody MD, 03/04/21, 11:00 AM EST.

## 2021-03-23 ENCOUNTER — OFFICE VISIT (OUTPATIENT)
Dept: OBSTETRICS AND GYNECOLOGY | Facility: CLINIC | Age: 66
End: 2021-03-23

## 2021-03-23 VITALS
SYSTOLIC BLOOD PRESSURE: 116 MMHG | HEIGHT: 64 IN | WEIGHT: 155.4 LBS | DIASTOLIC BLOOD PRESSURE: 78 MMHG | BODY MASS INDEX: 26.53 KG/M2

## 2021-03-23 DIAGNOSIS — I25.119 CORONARY ARTERY DISEASE INVOLVING NATIVE CORONARY ARTERY OF NATIVE HEART WITH ANGINA PECTORIS (HCC): ICD-10-CM

## 2021-03-23 DIAGNOSIS — Z01.419 PAP TEST, AS PART OF ROUTINE GYNECOLOGICAL EXAMINATION: Primary | ICD-10-CM

## 2021-03-23 DIAGNOSIS — N95.2 ATROPHIC VAGINITIS: ICD-10-CM

## 2021-03-23 DIAGNOSIS — N95.1 MENOPAUSAL SYMPTOMS: ICD-10-CM

## 2021-03-23 PROBLEM — I25.10 CORONARY ARTERY DISEASE: Status: ACTIVE | Noted: 2021-03-23

## 2021-03-23 PROCEDURE — G0101 CA SCREEN;PELVIC/BREAST EXAM: HCPCS | Performed by: OBSTETRICS & GYNECOLOGY

## 2021-03-23 RX ORDER — ESTRADIOL 0.1 MG/G
2 CREAM VAGINAL 3 TIMES WEEKLY
Qty: 42.5 G | Refills: 11 | Status: SHIPPED | OUTPATIENT
Start: 2021-03-24 | End: 2021-09-09 | Stop reason: SDUPTHER

## 2021-03-23 NOTE — PROGRESS NOTES
GYN Annual Exam     CC - Here for annual exam.     Subjective   HPI  Preeti Broussardr is a 65 y.o. female, , who presents for annual well woman exam.  She is postmenopausal. Her last LMP was No LMP recorded (lmp unknown). Patient is postmenopausal.. Patient reports problems with: none.  Partner Status: Marital Status: .    Desires STD Screening: YES/NO/Other: no.    Additional OB/GYN History   Current contraception: none    Last Pap :   Last Completed Pap Smear       Status Date      PAP SMEAR Done 3/20/2020 negative        History of abnormal Pap smear: no  Family history of uterine, colon, breast, or ovarian cancer: yes- MGM  Last mammogram:   Last Completed Mammogram       Status Date      MAMMOGRAM Done 3/20/2020 MAMMO SCREENING DIGITAL TOMOSYNTHESIS BILATERAL W CAD     Patient has more history with this topic...        Last colonoscopy:   Last Completed Colonoscopy       Status Date      COLONOSCOPY Done 2018 SCANNED - COLONOSCOPY        Last DEXA: 2019  Exercises Regularly: yes  Feelings of Anxiety or Depression: no    Tobacco Usage?: No    Health Maintenance   Topic Date Due   • DXA SCAN  Never done   • ZOSTER VACCINE (1 of 2) Never done   • HEPATITIS C SCREENING  Never done   • ANNUAL WELLNESS VISIT  Never done   • Pneumococcal Vaccine 65+ (1 of 1 - PPSV23) Never done   • INFLUENZA VACCINE  Never done   • COVID-19 Vaccine (2 - Pfizer 2-dose series) 2021   • LIPID PANEL  2021   • MAMMOGRAM  2022   • PAP SMEAR  2023   • COLONOSCOPY  2028   • TDAP/TD VACCINES (3 - Td) 2029   • MENINGOCOCCAL VACCINE  Aged Out       Current Outpatient Medications   Medication Sig Dispense Refill   • Acetaminophen (TYLENOL EXTRA STRENGTH PO) Take  by mouth As Needed.     • aspirin tablet Take 81 mg by mouth Daily.     • Cholecalciferol (VITAMIN D) 2000 UNITS capsule Take 2,000 Units by mouth Daily.     • coenzyme Q10 100 MG capsule Take 100 mg by mouth Daily.     •  "ELDERBERRY PO Take  by mouth Daily.     • [START ON 3/24/2021] estradiol (ESTRACE) 0.1 MG/GM vaginal cream Insert 2 g into the vagina 3 (Three) Times a Week. 42.5 g 11   • hydroCHLOROthiazide (HYDRODIURIL) 25 MG tablet Take 25 mg by mouth Daily As Needed. 1/2 tablet daily     • ketoconazole (NIZORAL) 2 % shampoo Apply  topically.     • levothyroxine (SYNTHROID, LEVOTHROID) 25 MCG tablet Take 25 mcg by mouth Daily.     • pentosan polysulfate (ELMIRON) 100 MG capsule Take 1 capsule by mouth Daily.     • rosuvastatin (CRESTOR) 10 MG tablet Take 1 tablet by mouth Daily. 90 tablet 3   • Zinc 30 MG tablet Take  by mouth Daily.     • midodrine (PROAMATINE) 5 MG tablet Take 1 tablet by mouth Every 8 (Eight) Hours. (Patient taking differently: Take 5 mg by mouth Every 8 (Eight) Hours. 1/2 tablet QD) 90 tablet 3     No current facility-administered medications for this visit.       The additional following portions of the patient's history were reviewed and updated as appropriate: allergies, current medications, past family history, past medical history, past social history and past surgical history.    Review of Systems   All other systems reviewed and are negative.      I have reviewed and agree with the HPI, ROS, and historical information as entered above. Lincoln More MD    Objective   /78 (BP Location: Right arm, Patient Position: Sitting, Cuff Size: Adult)   Ht 162.6 cm (64\")   Wt 70.5 kg (155 lb 6.4 oz)   LMP  (LMP Unknown) Comment: last mammo march 2019  BMI 26.67 kg/m²     PE    Breast: Without masses ,nontender, no skin changes or retractions  Axilla: Normal, no lymphadenopathy  Heart: Regular rate no murmurs rubs or gallops  Lungs: Clear to auscultation, normal breath sounds bilaterally  Abdomen: Soft nontender, no hepatosplenomegaly, no guarding or rebound, no masses  Pelvic exam  External genitalia: Normal introitus and vulva  Vagina: Normal mucosa no bleeding inflammation or discharge  Bladder: " Normal position nontender  Urethral meatus and urethra: Normal nontender  Cervix: No lesions, no discharge, bleeding or inflammation  Bimanual: Nontender adnexa clear, no sign of uterine or ovarian enlargement  Anal: No external lesions or hemorrhoids    Rectovaginal:negative, Hemoccult negative    Assessment/Plan     Assessment     Problem List Items Addressed This Visit        Cardiac and Vasculature    Coronary artery disease involving native coronary artery of native heart with angina pectoris (CMS/[facility])    Overview     S/p CABG by Dr. Moody 1/5/21         Relevant Medications    midodrine (PROAMATINE) 5 MG tablet       Genitourinary and Reproductive     Menopausal symptoms    Atrophic vaginitis    Relevant Medications    estradiol (ESTRACE) 0.1 MG/GM vaginal cream (Start on 3/24/2021)      Other Visit Diagnoses     Pap test, as part of routine gynecological examination    -  Primary    Relevant Orders    IGP, Rfx Aptima HPV ASCU          1. GYN annual well woman exam.   2. Normal exam patient had a coronary artery bypass graft this year    Plan     1. Next pap due in: 2 to 3 years  2. Reviewed HPV guidelines  3. Reviewed monthly self breast exams.  Instructed to call with lumps, pain, or breast discharge.  Yearly mammograms ordered.  4. Anticipatory menopausal guidance given.  Healthy lifestyle changes including diet and exercise reviewed  Preventative health initiatives reviewed, printed information given  Lincoln More MD  03/23/2021

## 2021-04-13 ENCOUNTER — TELEPHONE (OUTPATIENT)
Dept: CARDIOLOGY | Facility: CLINIC | Age: 66
End: 2021-04-13

## 2021-04-13 NOTE — TELEPHONE ENCOUNTER
Spoke with PT this morning regarding midodrine use. She had stopped the medication after January visit- her BP has been running 100-112 systolic so she restarted Midodrine back at 2.5 mg daily in the morning and feels much better with this dosing- she will continue this dose, monitor her BP and call me with any other issues. She also reports her HR sometimes in the low 90's. She will keep an eye on this and call me if consistently running greater than 95

## 2021-05-21 ENCOUNTER — HOSPITAL ENCOUNTER (OUTPATIENT)
Dept: MAMMOGRAPHY | Facility: HOSPITAL | Age: 66
Discharge: HOME | End: 2021-05-21
Admitting: OBSTETRICS & GYNECOLOGY

## 2021-05-21 DIAGNOSIS — Z12.31 VISIT FOR SCREENING MAMMOGRAM: ICD-10-CM

## 2021-05-21 PROCEDURE — 77067 SCR MAMMO BI INCL CAD: CPT

## 2021-05-21 PROCEDURE — 77063 BREAST TOMOSYNTHESIS BI: CPT | Performed by: RADIOLOGY

## 2021-05-21 PROCEDURE — 77063 BREAST TOMOSYNTHESIS BI: CPT

## 2021-05-21 PROCEDURE — 77067 SCR MAMMO BI INCL CAD: CPT | Performed by: RADIOLOGY

## 2021-07-14 ENCOUNTER — TELEPHONE (OUTPATIENT)
Dept: CARDIOLOGY | Facility: CLINIC | Age: 66
End: 2021-07-14

## 2021-07-14 NOTE — TELEPHONE ENCOUNTER
Called this number- no answer and no vm to leave a message. Called [phone] and no answer and just continued to ring.     Will send message through [email or web address]

## 2021-07-28 ENCOUNTER — OFFICE VISIT (OUTPATIENT)
Dept: CARDIOLOGY | Facility: CLINIC | Age: 66
End: 2021-07-28

## 2021-07-28 VITALS — HEIGHT: 63 IN | WEIGHT: 158 LBS | BODY MASS INDEX: 28 KG/M2

## 2021-07-28 DIAGNOSIS — Z95.1 S/P CABG (CORONARY ARTERY BYPASS GRAFT): ICD-10-CM

## 2021-07-28 DIAGNOSIS — I25.119 CORONARY ARTERY DISEASE INVOLVING NATIVE CORONARY ARTERY OF NATIVE HEART WITH ANGINA PECTORIS (HCC): Primary | ICD-10-CM

## 2021-07-28 DIAGNOSIS — E78.5 DYSLIPIDEMIA: ICD-10-CM

## 2021-07-28 PROCEDURE — 99213 OFFICE O/P EST LOW 20 MIN: CPT | Performed by: NURSE PRACTITIONER

## 2021-07-28 NOTE — PROGRESS NOTES
[facility]    Patient ID: Preeti Guerra is a 66 y.o. female.  : 1955   Contact: [phone]    Encounter date: 2021    PCP: Elaine Marcos MD      Chief complaint:   Chief Complaint   Patient presents with   • Coronary Artery Disease       Problem List:  1. Coronary artery disease  a. Echocardiogram 16, ZURI Sethi MD revealing NL LVEF 55-60%, mild MR, mild AR, trace TR.  b. Normal Carotid Duplex 16  c. Stress cardiolite 2020: mildly reduced exercise tolerance, stopped exercise due to fatigue, SOA. area of mild fixed apical hypoperfusion was seen which was actually worse in the resting images and felt to be a normal variant. The 3D reconstruction showed normal contractility in all segments. The calculated ejection fraction of 68%. No left ventricular  dilation was seen with stress.  d. LHC, 2020: Significant left main disease by IFR. Noncritical disease in the LAD circumflex and RCA. Normal LV systolic function wall motion.  e. Carotid duplex, 2020: ALVINO and LICA stenoses of 0-49%. No carotid plaque seen. Mild intimal thickening noted bilaterally.  f. Echocardiogram, 2020: EF 60%. Mild AR. Trace to mild MR. Trace TR.   g. CABG x 2 by Dr. Moody on 01/15/2021.  2. Abnormal EKG 20.  3. Hyperlipidemia- diet controlled  4. GERD  5.  Anxiety  6. Celiac Disease    No Known Allergies    Current Medications:    Current Outpatient Medications:   •  Acetaminophen (TYLENOL EXTRA STRENGTH PO), Take  by mouth As Needed., Disp: , Rfl:   •  aspirin tablet, Take 81 mg by mouth Daily., Disp: , Rfl:   •  Cholecalciferol (VITAMIN D) 2000 UNITS capsule, Take 2,000 Units by mouth Daily., Disp: , Rfl:   •  estradiol (ESTRACE) 0.1 MG/GM vaginal cream, Insert 2 g into the vagina 3 (Three) Times a Week., Disp: 42.5 g, Rfl: 11  •  hydroCHLOROthiazide (HYDRODIURIL) 25 MG tablet, Take 12.5 mg by mouth Daily., Disp: , Rfl:   •  ketoconazole  "(NIZORAL) 2 % shampoo, Apply  topically., Disp: , Rfl:   •  levothyroxine (SYNTHROID, LEVOTHROID) 25 MCG tablet, Take 25 mcg by mouth Daily., Disp: , Rfl:   •  midodrine (PROAMATINE) 5 MG tablet, Take 1 tablet by mouth Every 8 (Eight) Hours. (Patient taking differently: Take 5 mg by mouth Every 8 (Eight) Hours. 1/2 tablet QD), Disp: 90 tablet, Rfl: 3  •  pentosan polysulfate (ELMIRON) 100 MG capsule, Take 1 capsule by mouth Daily., Disp: , Rfl:   •  rosuvastatin (CRESTOR) 10 MG tablet, Take 1 tablet by mouth Daily., Disp: 90 tablet, Rfl: 3    [facility]    Preeti Guerra is a 66 y.o. female who presents today for a 6 month follow up of coronary artery disease s/p CABG and cardiac risk factors. Since last visit, she has done well overall. She has some incisional soreness at sternotomy site. Her BP hs been mostly 120's. She is currently on HCTZ 25 mg daily. She had increased swelling on 12.5 mg of this. She was unable to stop midodrine all together due to lightheadedness. She had one brief episode of syncope a few weeks ago. These episodes are very sporadic and occur once every few years.  No PND, orthopnea, STEF.     The following portions of the patient's history were reviewed and updated as appropriate: allergies, current medications and problem list.    Pertinent positives as listed in the HPI.  All other systems reviewed are negative.         Vitals:    07/28/21 1102   Weight: 71.7 kg (158 lb)   Height: 160 cm (63\")     Vitals:    07/28/21 1102 07/28/21 1103 07/28/21 1104   Orthostatic BP: 124/72 122/68 122/70   Orthostatic Pulse: 69 73 82   Patient Position: Lying Sitting Standing         Physical Exam:  General: Alert and oriented.  Neck: Jugular venous pressure is within normal limits. Carotids have normal upstrokes without bruits.   Cardiovascular: Heart has a nondisplaced focal PMI. Regular rate and rhythm. No murmur, gallop or rub.  Lungs: Clear, no rales or wheezes. Equal expansion is noted.   Extremities: " Show no edema.  Skin: Warm and dry.  Neurologic: Nonfocal.     Diagnostic Data (reviewed with patient):     Lab Results   Component Value Date    GLUCOSE 104 (H) 01/09/2021    BUN 23 01/09/2021    CREATININE 0.72 01/09/2021    EGFRIFNONA 81 01/09/2021    BCR 31.9 (H) 01/09/2021     01/09/2021    K 3.5 01/09/2021    CL 99 01/09/2021    CO2 33.0 (H) 01/09/2021    CALCIUM 9.4 01/09/2021    ALBUMIN 4.50 01/06/2021    ALKPHOS 106 01/04/2021    AST 20 01/04/2021    ALT 16 01/04/2021     Lab Results   Component Value Date    CHOL 227 (H) 12/18/2020    TRIG 156 (H) 12/18/2020    HDL 48 12/18/2020     (H) 12/18/2020      Lab Results   Component Value Date    WBC 8.65 01/08/2021    RBC 2.93 (L) 01/08/2021    HGB 9.0 (L) 01/09/2021    HCT 28.4 (L) 01/09/2021    MCV 95.9 01/08/2021     01/08/2021         Procedures      Assessment:    ICD-10-CM ICD-9-CM   1. Coronary artery disease involving native coronary artery of native heart with angina pectoris (CMS/[facility])  I25.119 414.01     413.9   2. S/P CABG (coronary artery bypass graft)  Z95.1 V45.81   3. Dyslipidemia  E78.5 272.4         Plan:  1. Discussed with patient that echo, carotid duplex were unrevealing for source of intermittent near syncope/syncope  And that this could be a result of a drop in her blood pressure. However, we have not ruled out arrhythmias. Since these events occurs so infrequent, a 30 day monitor is unlikely to be revealing. We could implant a loop recorder but she would like to hold off on this.   2. Continue ASA, statin for CAD  3. Check FLP, CMP within the next week. Gave her an order for this.   4. Patient was counseled to begin aerobic exercise 30 min per day for at least 4 days per week.   5. Continue midodrine 2.5 mg daily for orthostasis. Okay to continue HCTZ 25 mg daily for lower extremity edema.  6. Continue all other current medications.  7. F/up in 8 months, sooner if needed.      Electronically signed by Radha WILSON  Earnestine, LAUREN, 07/28/21, 11:26 AM EDT.

## 2021-08-03 DIAGNOSIS — E78.5 DYSLIPIDEMIA: ICD-10-CM

## 2021-08-17 DIAGNOSIS — E78.5 DYSLIPIDEMIA: Primary | ICD-10-CM

## 2021-08-17 RX ORDER — ROSUVASTATIN CALCIUM 20 MG/1
20 TABLET, COATED ORAL DAILY
Qty: 90 TABLET | Refills: 1 | Status: SHIPPED | OUTPATIENT
Start: 2021-08-17 | End: 2022-03-14 | Stop reason: SDUPTHER

## 2021-09-09 ENCOUNTER — TELEPHONE (OUTPATIENT)
Dept: OBSTETRICS AND GYNECOLOGY | Facility: CLINIC | Age: 66
End: 2021-09-09

## 2021-09-09 ENCOUNTER — TRANSCRIBE ORDERS (OUTPATIENT)
Dept: ADMINISTRATIVE | Facility: HOSPITAL | Age: 66
End: 2021-09-09

## 2021-09-09 DIAGNOSIS — Z12.31 VISIT FOR SCREENING MAMMOGRAM: Primary | ICD-10-CM

## 2021-09-09 DIAGNOSIS — N95.2 ATROPHIC VAGINITIS: ICD-10-CM

## 2021-09-09 RX ORDER — ESTRADIOL 0.1 MG/G
2 CREAM VAGINAL 3 TIMES WEEKLY
Qty: 42.5 G | Refills: 11 | Status: SHIPPED | OUTPATIENT
Start: 2021-09-10 | End: 2023-07-25 | Stop reason: SDUPTHER

## 2022-02-28 RX ORDER — MIDODRINE HYDROCHLORIDE 5 MG/1
TABLET ORAL
Qty: 90 TABLET | Refills: 3 | Status: SHIPPED | OUTPATIENT
Start: 2022-02-28 | End: 2022-07-27

## 2022-03-14 RX ORDER — ROSUVASTATIN CALCIUM 20 MG/1
20 TABLET, COATED ORAL DAILY
Qty: 90 TABLET | Refills: 1 | Status: SHIPPED | OUTPATIENT
Start: 2022-03-14 | End: 2022-08-12

## 2022-05-05 ENCOUNTER — OFFICE VISIT (OUTPATIENT)
Dept: OBSTETRICS AND GYNECOLOGY | Facility: CLINIC | Age: 67
End: 2022-05-05

## 2022-05-05 VITALS
BODY MASS INDEX: 28.7 KG/M2 | SYSTOLIC BLOOD PRESSURE: 110 MMHG | DIASTOLIC BLOOD PRESSURE: 60 MMHG | HEIGHT: 63 IN | WEIGHT: 162 LBS

## 2022-05-05 DIAGNOSIS — Z01.419 ROUTINE GYNECOLOGICAL EXAMINATION: Primary | ICD-10-CM

## 2022-05-05 DIAGNOSIS — N95.2 POSTMENOPAUSAL ATROPHIC VAGINITIS: ICD-10-CM

## 2022-05-05 DIAGNOSIS — Z12.31 ENCOUNTER FOR SCREENING MAMMOGRAM FOR MALIGNANT NEOPLASM OF BREAST: ICD-10-CM

## 2022-05-05 PROCEDURE — G0101 CA SCREEN;PELVIC/BREAST EXAM: HCPCS | Performed by: OBSTETRICS & GYNECOLOGY

## 2022-05-05 PROCEDURE — 3015F CERV CANCER SCREEN DOCD: CPT | Performed by: OBSTETRICS & GYNECOLOGY

## 2022-05-05 RX ORDER — LEVOTHYROXINE SODIUM 0.05 MG/1
50 TABLET ORAL DAILY
COMMUNITY
Start: 2022-04-27

## 2022-05-05 NOTE — PROGRESS NOTES
GYN Annual Exam     CC - Here for annual exam. Patient is an established patient. She is a former patient of Dr More. The patient requests a pap smear today    Subjective   HPI  Preetiamaya Broussardr is a 66 y.o. female, , who presents for annual well woman exam.  She is postmenopausal.  Patient reports problems with: none.  Partner Status: Marital Status: .  New Partners since last visit: no.    She is s/p CABG     Additional OB/GYN History   Current contraception: contraceptive methods: Post menopausal status    Last Pap : 3/23/21 negative  Last Completed Pap Smear          Ordered - PAP SMEAR (Every 3 Years) Ordered on 2021  SCANNED - PAP SMEAR    2020  Done - negative              History of abnormal Pap smear: no  Family history of uterine, colon, breast, or ovarian cancer: no  Performs monthly Self-Breast Exam: yes  Last mammogram:   Last Completed Mammogram          Scheduled - MAMMOGRAM (Every 2 Years) Scheduled for 2021  Mammo Screening Digital Tomosynthesis Bilateral With CAD    2020  Mammo Screening Digital Tomosynthesis Bilateral With CAD    2019  Mammo Screening Digital Tomosynthesis Bilateral With CAD    2018  Mammo screening digital tomosynthesis bilateral w CAD    2016  Mammo Screening Digital Tomosynthesis Bilateral With CAD    Only the first 5 history entries have been loaded, but more history exists.              Last colonoscopy:  negative already scheduled for   Last Completed Colonoscopy          COLORECTAL CANCER SCREENING (COLONOSCOPY - Every 10 Years) Next due on 2018  SCANNED - COLONOSCOPY              Last DEXA: negative   Exercises Regularly: yes  Feelings of Anxiety or Depression: no    Tobacco Usage?: No   OB History        3    Para   3    Term   3       0    AB   0    Living   3       SAB   0    IAB   0    Ectopic   0    Molar   0    Multiple   0    Live  "Births   3                Health Maintenance   Topic Date Due   • ZOSTER VACCINE (1 of 2) Never done   • HEPATITIS C SCREENING  Never done   • ANNUAL WELLNESS VISIT  Never done   • Pneumococcal Vaccine 65+ (1 - PCV) Never done   • DXA SCAN  02/20/2021   • COVID-19 Vaccine (3 - Booster for Pfizer series) 09/01/2021   • LIPID PANEL  12/18/2021   • INFLUENZA VACCINE  08/01/2022   • MAMMOGRAM  05/21/2023   • PAP SMEAR  03/23/2024   • COLORECTAL CANCER SCREENING  08/30/2028   • TDAP/TD VACCINES (3 - Td or Tdap) 04/09/2029       The additional following portions of the patient's history were reviewed and updated as appropriate: problem list.    Review of Systems   Constitutional: Negative for chills, fatigue, unexpected weight gain and unexpected weight loss.   HENT: Negative for sore throat and swollen glands.    Respiratory: Negative for cough and shortness of breath.    Cardiovascular: Negative for chest pain, palpitations and leg swelling.   Gastrointestinal: Negative for abdominal distention, abdominal pain, blood in stool, constipation, diarrhea and nausea.   Endocrine: Negative for cold intolerance and heat intolerance.   Genitourinary: Negative for breast discharge, breast lump, frequency, hematuria, pelvic pain, vaginal bleeding, vaginal discharge and vaginal pain.   Musculoskeletal: Negative for gait problem and myalgias.   Allergic/Immunologic: Negative for immunocompromised state.   Neurological: Negative for dizziness, headache and confusion.   Psychiatric/Behavioral: Negative for suicidal ideas and depressed mood.   All other systems reviewed and are negative.      I have reviewed and agree with the HPI, ROS, and historical information as entered above. Ping Elias MD    Objective   /60   Ht 160 cm (63\")   Wt 73.5 kg (162 lb)   LMP  (LMP Unknown) Comment: last mammo march 2019  BMI 28.70 kg/m²     Physical Exam    General:  well developed; well nourished  no acute distress  Skin:  No suspicious " lesions seen  Thyroid: normal to inspection and palpation  Breasts:  Examined in supine position  Symmetric without masses or skin dimpling  Nipples normal without inversion, lesions or discharge  There are no palpable axillary nodes  CVS: RRR, no M/R/G, distal pulses wnl  Resp: CTAB, No W/R/R  Abdomen: soft, non-tender; no masses  no umbilical or inguinal hernias are present  no hepato-splenomegaly  Pelvis: Clinical staff was present for exam  External genitalia:  normal appearance of the external genitalia including Bartholin's and [place]'s glands.  Urethra: no masses or tenderness  Urethral meatus: normal size;  No lesions or signs of prolapse  Bladder: non tender to palpation, no masses, no prolapse  Vagina:  normal pink mucosa without prolapse or lesions.  Cervix:  normal appearance.  Uterus:  normal size, shape and consistency.  Adnexa:  normal bimanual exam of the adnexa.  Perineum/Anus: normal appearance, no external hemorrhoids  Ext: 2+ pulses, no edema    Assessment/Plan     Assessment     Problem List Items Addressed This Visit    None     Visit Diagnoses     Routine gynecological examination    -  Primary    Relevant Orders    Liquid-based Pap Smear, Screening          1. GYN annual well woman exam.   2. No menopausal symptoms.   3. Plan BDS with next annual.     Plan     1. Pap with HPV done  2. Mammogram ordered today  3. Reviewed monthly self breast exams.  Instructed to call with lumps, pain, or breast discharge.    4. Reviewed exercise and healthy diet as a preventative health measures.   5. Osteoporosis screening ordered today  6. Reccommended Flu Vaccine in Fall of each year.  7. RTC in 1 year or PRN with problems    Ping Elias MD  05/05/2022

## 2022-05-12 ENCOUNTER — TELEPHONE (OUTPATIENT)
Dept: OBSTETRICS AND GYNECOLOGY | Facility: CLINIC | Age: 67
End: 2022-05-12

## 2022-05-16 ENCOUNTER — TELEPHONE (OUTPATIENT)
Dept: OBSTETRICS AND GYNECOLOGY | Facility: CLINIC | Age: 67
End: 2022-05-16

## 2022-05-24 ENCOUNTER — APPOINTMENT (OUTPATIENT)
Dept: MAMMOGRAPHY | Facility: HOSPITAL | Age: 67
End: 2022-05-24

## 2022-05-26 ENCOUNTER — TELEPHONE (OUTPATIENT)
Dept: OBSTETRICS AND GYNECOLOGY | Facility: CLINIC | Age: 67
End: 2022-05-26

## 2022-05-26 NOTE — TELEPHONE ENCOUNTER
Pt states she spoke with her sister about how she has been using an estradiol cream vaginally for years and her sister told her that her dr told her it could cause cancer without being paired with progesterone.     I explained to the pt that using this cream is different from taking an oral pill that the vaginal cream has little to no systemic absorption unlike the oral pill. So she is taking this correctly    She VU

## 2022-05-26 NOTE — TELEPHONE ENCOUNTER
Pt called with questions again regarding medication. Let her know we had attempted to call back multiple times but her voicemail isn't set up. Pt requested we try both home and cell when returning call. Please CB and advise.

## 2022-05-27 ENCOUNTER — HOSPITAL ENCOUNTER (OUTPATIENT)
Dept: MAMMOGRAPHY | Facility: HOSPITAL | Age: 67
Discharge: HOME | End: 2022-05-27
Admitting: OBSTETRICS & GYNECOLOGY

## 2022-05-27 DIAGNOSIS — Z12.31 VISIT FOR SCREENING MAMMOGRAM: ICD-10-CM

## 2022-05-27 PROCEDURE — 77067 SCR MAMMO BI INCL CAD: CPT

## 2022-05-27 PROCEDURE — 77063 BREAST TOMOSYNTHESIS BI: CPT | Performed by: RADIOLOGY

## 2022-05-27 PROCEDURE — 77067 SCR MAMMO BI INCL CAD: CPT | Performed by: RADIOLOGY

## 2022-05-27 PROCEDURE — 77063 BREAST TOMOSYNTHESIS BI: CPT

## 2022-07-27 ENCOUNTER — OFFICE VISIT (OUTPATIENT)
Dept: CARDIOLOGY | Facility: CLINIC | Age: 67
End: 2022-07-27

## 2022-07-27 VITALS
HEART RATE: 91 BPM | OXYGEN SATURATION: 97 % | SYSTOLIC BLOOD PRESSURE: 112 MMHG | DIASTOLIC BLOOD PRESSURE: 70 MMHG | BODY MASS INDEX: 27.9 KG/M2 | WEIGHT: 163.4 LBS | HEIGHT: 64 IN

## 2022-07-27 DIAGNOSIS — I25.10 CORONARY ARTERY DISEASE INVOLVING NATIVE CORONARY ARTERY OF NATIVE HEART WITHOUT ANGINA PECTORIS: Primary | ICD-10-CM

## 2022-07-27 DIAGNOSIS — Z95.1 S/P CABG (CORONARY ARTERY BYPASS GRAFT): ICD-10-CM

## 2022-07-27 DIAGNOSIS — E78.5 DYSLIPIDEMIA: ICD-10-CM

## 2022-07-27 PROCEDURE — 99213 OFFICE O/P EST LOW 20 MIN: CPT | Performed by: INTERNAL MEDICINE

## 2022-07-27 NOTE — PROGRESS NOTES
[facility]    Patient ID: Preeti Guerra is a 67 y.o. female.  : 1955   Contact: [phone]    Encounter date: 2022    PCP: Elaine Marcos MD      Chief complaint:   Chief Complaint   Patient presents with   • Coronary Artery Disease   • Chest soreness       Problem List:  1. Coronary artery disease  a. Echocardiogram 16, ZURI Sethi MD revealing NL LVEF 55-60%, mild MR, mild AR, trace TR.  b. Normal Carotid Duplex 16  c. Stress cardiolite 2020: mildly reduced exercise tolerance, stopped exercise due to fatigue, SOA. area of mild fixed apical hypoperfusion was seen which was actually worse in the resting images and felt to be a normal variant. The 3D reconstruction showed normal contractility in all segments. The calculated ejection fraction of 68%. No left ventricular  dilation was seen with stress.  d. LHC, 2020: Significant left main disease by IFR. Noncritical disease in the LAD circumflex and RCA. Normal LV systolic function wall motion.  e. Carotid duplex, 2020: ALVINO and LICA stenoses of 0-49%. No carotid plaque seen. Mild intimal thickening noted bilaterally.  f. Echocardiogram, 2020: EF 60%. Mild AR. Trace to mild MR. Trace TR.   g. ROCÍO, 2021; EF 55%.   h. CABG x 2 by Dr. Moody on 01/15/2021.  2. Abnormal EKG 20.  3. Hyperlipidemia- diet controlled  4. GERD  5.  Anxiety  6. Celiac Disease       No Known Allergies    Current Medications:    Current Outpatient Medications:   •  Acetaminophen (TYLENOL EXTRA STRENGTH PO), Take 1-2 tablets by mouth As Needed., Disp: , Rfl:   •  aspirin tablet, Take 81 mg by mouth Daily., Disp: , Rfl:   •  Cholecalciferol (VITAMIN D) 2000 UNITS capsule, Take 2,000 Units by mouth Daily., Disp: , Rfl:   •  estradiol (ESTRACE) 0.1 MG/GM vaginal cream, Insert 2 g into the vagina 3 (Three) Times a Week., Disp: 42.5 g, Rfl: 11  •  hydroCHLOROthiazide (HYDRODIURIL) 25  MG tablet, Take 12.5 mg by mouth Daily., Disp: , Rfl:   •  ketoconazole (NIZORAL) 2 % shampoo, Apply  topically., Disp: , Rfl:   •  levothyroxine (SYNTHROID, LEVOTHROID) 50 MCG tablet, Take 50 mcg by mouth Daily., Disp: , Rfl:   •  Multiple Vitamins-Minerals (ICAPS AREDS 2 PO), Take 1 capsule by mouth Daily., Disp: , Rfl:   •  Omega-3 Fatty Acids (FISH OIL PO), Take 1 tablet by mouth Daily., Disp: , Rfl:   •  pentosan polysulfate (ELMIRON) 100 MG capsule, Take 1 capsule by mouth Daily., Disp: , Rfl:   •  rosuvastatin (CRESTOR) 20 MG tablet, Take 1 tablet by mouth Daily., Disp: 90 tablet, Rfl: 1    HPI    Preeti Guerra is a 67 y.o. female who presents today for a follow up of coronary artery disease s/p CABG and cardiac risk factors. Since last visit, the patient has been doing well overall from a cardiovascular standpoint. She states that she has tapered off the midodrine. She states that she has an intermittent tender feeling on the left side of her chest. She did have her cholesterol levels checked a few months ago by her PCP and will send a copy of those results.     She states she does have painful edema behind her left knee. She states that her PCP did a scan on her leg about a year ago which showed normal results. She is worried about an aneurysm or blockage. She does not recall which type of scan was done. Patient does not have a regular exercise routine.     She does state that shortly after her surgery she coughed and had a choking feeling on phlegm and is still experiencing that. Patient denies chest pain, shortness of breath, orthopnea, palpitations, dizziness, and syncope.      The following portions of the patient's history were reviewed and updated as appropriate: allergies, current medications and problem list.    Pertinent positives as listed in the HPI.  All other systems reviewed are negative.         Vitals:    07/27/22 1456   BP: 112/70   BP Location: Left arm   Patient Position:  "Sitting   Pulse: 91   SpO2: 97%   Weight: 74.1 kg (163 lb 6.4 oz)   Height: 162.6 cm (64\")       Physical Exam:  General: Alert and oriented.  Neck: Jugular venous pressure is within normal limits. Carotids have normal upstrokes without bruits.   Cardiovascular: Heart has a nondisplaced focal PMI. Regular rate and rhythm. No murmur, gallop or rub.  Lungs: Clear, no rales or wheezes. Equal expansion is noted.   Extremities: Show no edema.  There is a firmness behind her left knee which feels superficial and cystic in nature.  2+ left PT and 1+ left DP  Skin: Warm and dry.  Neurologic: Nonfocal.     Diagnostic Data (reviewed with patient):    Lab date: 07/30/2021  • FLP: , , HDL 59, LDL 90           Procedures      Assessment:    ICD-10-CM ICD-9-CM   1. Coronary artery disease involving native coronary artery of native heart without angina pectoris  I25.10 414.01   2. S/P CABG (coronary artery bypass graft)  Z95.1 V45.81   3. Dyslipidemia  E78.5 272.4         Plan:  1. Patient was advised the edema behind her left knee was not an aneurysm. We will obtain a copy of her reports from her PCP.   2. Continue on aspirin 81 mg for antiplatelet therapy.   3. Continue on hydrochlorothiazide 12.5 mg daily for hypertension.   4. Continue on rosuvastatin 20 mg daily for hyperlipidemia.    5. Continue all other current medications.  6. F/up in 12 months, sooner if needed.    Scribed for Fatoumata Jasso MD by Gin Arthur. 7/27/2022 15:16 EDT       I Fatoumata Jasso MD personally performed the services described in this documentation as scribed by the above individual in my presence, and it is both accurate and complete.    Fatoumata Jasso MD, FACC              "

## 2022-07-28 ENCOUNTER — DOCUMENTATION (OUTPATIENT)
Dept: CARDIOLOGY | Facility: CLINIC | Age: 67
End: 2022-07-28

## 2022-08-09 ENCOUNTER — PATIENT MESSAGE (OUTPATIENT)
Dept: CARDIOLOGY | Facility: CLINIC | Age: 67
End: 2022-08-09

## 2022-08-12 RX ORDER — ROSUVASTATIN CALCIUM 40 MG/1
40 TABLET, COATED ORAL NIGHTLY
Qty: 90 TABLET | Refills: 0 | Status: SHIPPED | OUTPATIENT
Start: 2022-08-12 | End: 2022-11-22 | Stop reason: SDUPTHER

## 2022-08-12 NOTE — TELEPHONE ENCOUNTER
From: Preeti Guerra  To: Fatoumata Jasso MD  Sent: 8/9/2022 8:19 PM EDT  Subject: Midodrine    I am now taking Midodrine again. I was really tired for 2-3 days, light headed, and bp was 90/64. I feel better now that I’m taking it again. Thank you, Preeti Guerra

## 2022-10-17 ENCOUNTER — TRANSCRIBE ORDERS (OUTPATIENT)
Dept: ADMINISTRATIVE | Facility: HOSPITAL | Age: 67
End: 2022-10-17

## 2022-10-17 DIAGNOSIS — Z12.31 VISIT FOR SCREENING MAMMOGRAM: Primary | ICD-10-CM

## 2022-11-23 RX ORDER — ROSUVASTATIN CALCIUM 40 MG/1
40 TABLET, COATED ORAL NIGHTLY
Qty: 90 TABLET | Refills: 0 | Status: SHIPPED | OUTPATIENT
Start: 2022-11-23 | End: 2023-02-16 | Stop reason: SDUPTHER

## 2023-02-06 ENCOUNTER — OFFICE VISIT (OUTPATIENT)
Dept: GASTROENTEROLOGY | Facility: CLINIC | Age: 68
End: 2023-02-06
Payer: MEDICARE

## 2023-02-06 VITALS
WEIGHT: 163 LBS | BODY MASS INDEX: 27.83 KG/M2 | TEMPERATURE: 97.4 F | DIASTOLIC BLOOD PRESSURE: 74 MMHG | SYSTOLIC BLOOD PRESSURE: 122 MMHG | HEART RATE: 81 BPM | HEIGHT: 64 IN | OXYGEN SATURATION: 96 %

## 2023-02-06 DIAGNOSIS — Z80.0 FAMILY HISTORY OF COLON CANCER: ICD-10-CM

## 2023-02-06 DIAGNOSIS — K90.0 CELIAC SPRUE: Primary | ICD-10-CM

## 2023-02-06 PROBLEM — R04.0 EPISTAXIS: Status: ACTIVE | Noted: 2023-02-06

## 2023-02-06 PROCEDURE — 99203 OFFICE O/P NEW LOW 30 MIN: CPT | Performed by: INTERNAL MEDICINE

## 2023-02-06 RX ORDER — CYANOCOBALAMIN (VITAMIN B-12) 500 MCG
500 TABLET ORAL DAILY
COMMUNITY

## 2023-02-06 NOTE — PROGRESS NOTES
Patient Name: Preeti Guerra   YOB: 1955   Medical Record number: [id number]     PCP: Elaine Marcos MD    Chief Complaint   Patient presents with   • Celiac Disease       History of Present Illness:   HPI  I appreciate the consult for celiac sprue.  Ms. Guerra is a 68 yo with a history of celiac sprue diagnosed by Dr. Strickland many years ago.  She has done well on gluten-free diet.  Patient denies any abdominal bloating with meals.  There is no history of nausea.  She reports normal DEXA scans. Ms. Guerra has no issues with heartburn or difficulty swallowing.  The patient denies any unexplained weight loss.  She has regular bowel habits without blood in the stool. Her  last upper and lower endoscopy was in 2018 performed by Dr. Strickland.  There is a family history of an uncle with colon cancer.  Past Medical History:   Diagnosis Date   • Abnormal EKG    • Celiac disease     diet-controlled   • Colon polyp 20--   • Coronary artery disease     one vessel   • History of basal cell carcinoma (BCC) of skin 03/2012    removed from nose and right breast   • Hypothyroidism    • Interstitial cystitis     sees Dr. Acosta   • Menopause    • Mumps    • Osteopenia    • PONV (postoperative nausea and vomiting)    • Wears glasses        Past Surgical History:   Procedure Laterality Date   • ABDOMINOPLASTY     • AUGMENTATION MAMMAPLASTY Bilateral     patient has since had implants removed   • BASAL CELL CARCINOMA EXCISION  03/2012    removed from nose and R breast   • BREAST SURGERY  2008    implants removed   • CARDIAC CATHETERIZATION N/A 12/18/2020    Procedure: Left Heart Cath;  Surgeon: Fatoumata Jasso MD;  Location: [facility] INVASIVE LOCATION;  Service: Cardiology;  Laterality: N/A;   • COLONOSCOPY     • CORONARY ARTERY BYPASS GRAFT N/A 01/05/2021    Procedure: CORONARY ARTERY BYPASS GRAFTING x 2 WITH INTERNAL MAMMARY ARTERY GRAFT, EVH FROM RIGHT GREATER SAPHENOUS VEIN, ROCÍO;  Surgeon:  Ion Moody MD;  [facility];  Service: Cardiothoracic;  Laterality: N/A;  VO:0805  VR:0810   • LIPOSUCTION     • UPPER GASTROINTESTINAL ENDOSCOPY           Current Outpatient Medications:   •  Acetaminophen (TYLENOL EXTRA STRENGTH PO), Take 1-2 tablets by mouth As Needed., Disp: , Rfl:   •  aspirin tablet, Take 81 mg by mouth Daily., Disp: , Rfl:   •  Cholecalciferol (VITAMIN D) 2000 UNITS capsule, Take 2,000 Units by mouth Daily., Disp: , Rfl:   •  Cyanocobalamin (Vitamin B 12) 500 MCG tablet, Take 500 mcg by mouth Daily., Disp: , Rfl:   •  estradiol (ESTRACE) 0.1 MG/GM vaginal cream, Insert 2 g into the vagina 3 (Three) Times a Week., Disp: 42.5 g, Rfl: 11  •  hydroCHLOROthiazide (HYDRODIURIL) 25 MG tablet, Take 12.5 mg by mouth Daily., Disp: , Rfl:   •  ketoconazole (NIZORAL) 2 % shampoo, Apply  topically., Disp: , Rfl:   •  levothyroxine (SYNTHROID, LEVOTHROID) 50 MCG tablet, Take 50 mcg by mouth Daily., Disp: , Rfl:   •  Multiple Vitamins-Minerals (ICAPS AREDS 2 PO), Take 1 capsule by mouth Daily., Disp: , Rfl:   •  Omega-3 Fatty Acids (FISH OIL PO), Take 1 tablet by mouth Daily., Disp: , Rfl:   •  pentosan polysulfate (ELMIRON) 100 MG capsule, Take 1 capsule by mouth Daily., Disp: , Rfl:   •  rosuvastatin (CRESTOR) 40 MG tablet, Take 1 tablet by mouth Every Night., Disp: 90 tablet, Rfl: 0    No Known Allergies    Family History   Problem Relation Age of Onset   • Hypertension Mother    • Heart disease Mother    • No Known Problems Father    • Hypertension Sister    • Hypertension Sister    • Colon polyps Sister    • No Known Problems Sister    • No Known Problems Sister    • No Known Problems Sister    • No Known Problems Brother    • No Known Problems Maternal Aunt    • Lung cancer Maternal Uncle    • No Known Problems Paternal Aunt    • Breast cancer Maternal Grandmother 70   • Osteoporosis Paternal Grandmother    • No Known Problems Daughter    • No Known Problems Son    • Colon cancer Other     • Colon cancer Maternal Uncle    • BRCA 1/2 Neg Hx    • Ovarian cancer Neg Hx        Social History     Socioeconomic History   • Marital status:    • Number of children: 3   Tobacco Use   • Smoking status: Never   • Smokeless tobacco: Never   Vaping Use   • Vaping Use: Never used   Substance and Sexual Activity   • Alcohol use: No   • Drug use: No   • Sexual activity: Not Currently     Partners: Male     Birth control/protection: Post-menopausal       Review of Systems   Constitutional: Negative for activity change, appetite change, fatigue, fever and unexpected weight change.   HENT: Negative for dental problem, hearing loss, mouth sores, postnasal drip, sneezing, trouble swallowing and voice change.    Eyes: Negative for pain, redness, itching and visual disturbance.   Respiratory: Negative for cough, choking, chest tightness, shortness of breath and wheezing.    Cardiovascular: Negative for chest pain, palpitations and leg swelling.   Gastrointestinal: Negative for abdominal distention, abdominal pain (tenderness), anal bleeding, blood in stool, constipation, diarrhea, nausea, rectal pain and vomiting.        Heartburn   Endocrine: Negative for cold intolerance, heat intolerance, polydipsia, polyphagia and polyuria.   Genitourinary: Negative.  Negative for dysuria, enuresis, flank pain, hematuria and urgency.   Musculoskeletal: Negative for arthralgias, back pain, gait problem, joint swelling and myalgias.   Skin: Negative for color change, pallor and rash.   Allergic/Immunologic: Negative for environmental allergies, food allergies and immunocompromised state.   Neurological: Negative for dizziness, tremors, seizures, facial asymmetry, speech difficulty, numbness and headaches.   Hematological: Negative for adenopathy.   Psychiatric/Behavioral: Negative for behavioral problems, confusion, dysphoric mood, hallucinations and self-injury.       Vitals:    02/06/23 1449   BP: 122/74   Pulse: 81   Temp:  97.4 °F (36.3 °C)   SpO2: 96%       Physical Exam  Vitals reviewed.   Constitutional:       General: She is not in acute distress.     Appearance: Normal appearance.   HENT:      Head: Normocephalic and atraumatic.      Nose: Nose normal.      Mouth/Throat:      Mouth: Mucous membranes are moist.      Pharynx: Oropharynx is clear.   Eyes:      General: No scleral icterus.     Extraocular Movements: Extraocular movements intact.   Cardiovascular:      Rate and Rhythm: Normal rate and regular rhythm.      Heart sounds: No murmur heard.  Pulmonary:      Breath sounds: Normal breath sounds. No wheezing or rales.   Abdominal:      General: Bowel sounds are normal.      Palpations: Abdomen is soft.      Tenderness: There is no abdominal tenderness. There is no guarding.   Musculoskeletal:         General: No swelling. Normal range of motion.      Cervical back: Normal range of motion and neck supple.   Skin:     General: Skin is dry.      Coloration: Skin is not jaundiced.   Neurological:      Mental Status: She is alert and oriented to person, place, and time.   Psychiatric:         Mood and Affect: Mood normal.         Thought Content: Thought content normal.         Judgment: Judgment normal.         Diagnoses and all orders for this visit:    1. Celiac sprue (Primary)    2. Family history of colon cancer  -     Colonoscopy; Future    I reviewed the upper and lower endoscopy from 2018.  There was no significant villous blunting on the biopsy.  She is doing well on a gluten-free diet.  The last colonoscopy did not demonstrate any adenoma type polyps.      Plan: Will proceed with a colonoscopy for screening after the patient checks her schedule.

## 2023-02-16 RX ORDER — ROSUVASTATIN CALCIUM 40 MG/1
40 TABLET, COATED ORAL NIGHTLY
Qty: 90 TABLET | Refills: 0 | Status: SHIPPED | OUTPATIENT
Start: 2023-02-16

## 2023-05-17 RX ORDER — ROSUVASTATIN CALCIUM 40 MG/1
40 TABLET, COATED ORAL NIGHTLY
Qty: 90 TABLET | Refills: 0 | Status: SHIPPED | OUTPATIENT
Start: 2023-05-17

## 2023-05-18 NOTE — PROGRESS NOTES
[facility] Cardiology Daily Note       LOS: 2 days   Patient Care Team:  Elaine Marcos MD as PCP - General  Elaine Marcos MD as PCP - Family Medicine  RomeroFatoumata oneil MD as Consulting Physician (Cardiology)    Chief Complaint: Hyperlipidemia/hypotension    Subjective     Subjective: No chest discomfort on inspiration.  Blood pressure good.  96% on 2 L nasal cannula.  Mediastinal and pleural tubes are now out.      Review of Systems:   As above.    Medications:  aspirin, 325 mg, Oral, Daily  atorvastatin, 40 mg, Oral, Nightly  insulin lispro, 0-7 Units, Subcutaneous, TID AC  levothyroxine, 25 mcg, Oral, Q AM  metoprolol tartrate, 12.5 mg, Oral, Q12H  midodrine, 10 mg, Oral, Q8H  pharmacy consult - [facility], , Does not apply, Daily  senna-docusate sodium, 2 tablet, Oral, BID        Objective     Vital Sign Min/Max for last 24 hours  Temp  Min: 97.5 °F (36.4 °C)  Max: 98.1 °F (36.7 °C)   BP  Min: 87/57  Max: 107/55   Pulse  Min: 77  Max: 91   Resp  Min: 10  Max: 20   SpO2  Min: 91 %  Max: 99 %   Flow (L/min)  Min: 1  Max: 2   No data recorded      Intake/Output Summary (Last 24 hours) at 1/7/2021 0709  Last data filed at 1/7/2021 0600  Gross per 24 hour   Intake 1052.6 ml   Output 875 ml   Net 177.6 ml            Physical Exam:    General: Alert and oriented.   Cardiovascular: Heart has a nondisplaced focal PMI. Regular rate and rhythm without murmur, gallop or rub.  Lungs: Clear without rales or wheezes. Equal expansion is noted.   Abdomen: Soft, nontender.  Extremities: Show no edema.   Skin: warm and dry.     Results Review:    I reviewed the patient's new clinical results.  EKG:  Tele: Sinus rhythm    Labs:    Results from last 7 days   Lab Units 01/07/21  0357 01/06/21  0324 01/05/21  2351  01/05/21  1510  01/04/21  1515   SODIUM mmol/L 132* 139  --   --  141   < > 139   POTASSIUM mmol/L 4.6 3.8 3.9   < > 3.8   < > 3.6   CHLORIDE mmol/L 97* 102  --   --  105   < > 100   CO2  mmol/L 30.0* 28.0  --   --  26.0   < > 29.0   BUN mg/dL 12 12  --   --  12   < > 22   CREATININE mg/dL 0.62 0.80  --   --  0.83   < > 0.85   CALCIUM mg/dL 9.1 8.5*  --   --  9.4   < > 9.9   BILIRUBIN mg/dL  --   --   --   --   --   --  0.5   ALK PHOS U/L  --   --   --   --   --   --  106   ALT (SGPT) U/L  --   --   --   --   --   --  16   AST (SGOT) U/L  --   --   --   --   --   --  20   GLUCOSE mg/dL 120* 116*  --   --  136*   < > 115*    < > = values in this interval not displayed.     Results from last 7 days   Lab Units 01/07/21  0357 01/06/21  0324 01/05/21  2351 01/05/21 2000   WBC 10*3/mm3 10.89* 11.30*  --  10.75   HEMOGLOBIN g/dL 9.2* 8.8* 9.0* 8.7*  8.6*   HEMATOCRIT % 29.3* 27.5* 28.4* 27.2*  27.2*   PLATELETS 10*3/mm3 131* 166  --  155     No results found for: TROPONINI, TROPONINT  Lab Results   Component Value Date    CHOL 227 (H) 12/18/2020     Lab Results   Component Value Date    TRIG 156 (H) 12/18/2020     Lab Results   Component Value Date    HDL 48 12/18/2020     No components found for: LDLCALC  Lab Results   Component Value Date    INR 1.47 (H) 01/06/2021    INR 1.61 (H) 01/05/2021    INR 1.61 (H) 01/05/2021    PROTIME 17.5 (H) 01/06/2021    PROTIME 18.8 (H) 01/05/2021    PROTIME 18.9 (H) 01/05/2021         Ejection Fraction:    Assessment   Assessment:  1. Coronary artery disease  - [facility] 12/28/2020: significant left main disease by IFR, non critical disease in LAD, circumflex and RCA.   - Echo 12/18/2020: EF 60%, mild AR, trace to mild MR.  - s/p 2V CABG (LIMA to LAD, SVG to OM1), Dr. Moody, 1/5/2020    2.  Hypotension     3.  hyperlipidemia      Plan:    Ambulate as tolerated  Blood pressure too low for beta-blocker at this time.  Continue aspirin for coronary disease  Continue statin for coronary disease  Transfer to telemetry    Fatoumata Jasso MD  01/07/21  07:09 EST           Island Pedicle Flap Text: The defect edges were debeveled with a #15 scalpel blade.  Given the location of the defect, shape of the defect and the proximity to free margins an island pedicle advancement flap was deemed most appropriate.  Using a sterile surgical marker, an appropriate advancement flap was drawn incorporating the defect, outlining the appropriate donor tissue and placing the expected incisions within the relaxed skin tension lines where possible.    The area thus outlined was incised deep to adipose tissue with a #15 scalpel blade.  The skin margins were undermined to an appropriate distance in all directions around the primary defect and laterally outward around the island pedicle utilizing iris scissors.  There was minimal undermining beneath the pedicle flap.

## 2023-05-31 RX ORDER — MIDODRINE HYDROCHLORIDE 5 MG/1
5 TABLET ORAL DAILY
Qty: 30 TABLET | Refills: 3 | Status: SHIPPED | OUTPATIENT
Start: 2023-05-31

## 2023-07-24 ENCOUNTER — HOSPITAL ENCOUNTER (OUTPATIENT)
Dept: MAMMOGRAPHY | Facility: HOSPITAL | Age: 68
Discharge: HOME | End: 2023-07-24
Admitting: OBSTETRICS & GYNECOLOGY
Payer: MEDICARE

## 2023-07-24 DIAGNOSIS — Z12.31 VISIT FOR SCREENING MAMMOGRAM: ICD-10-CM

## 2023-07-24 PROCEDURE — 77063 BREAST TOMOSYNTHESIS BI: CPT

## 2023-07-24 PROCEDURE — 77067 SCR MAMMO BI INCL CAD: CPT

## 2023-07-25 ENCOUNTER — OFFICE VISIT (OUTPATIENT)
Dept: OBSTETRICS AND GYNECOLOGY | Facility: CLINIC | Age: 68
End: 2023-07-25
Payer: MEDICARE

## 2023-07-25 VITALS
WEIGHT: 161 LBS | HEIGHT: 64 IN | DIASTOLIC BLOOD PRESSURE: 60 MMHG | SYSTOLIC BLOOD PRESSURE: 100 MMHG | BODY MASS INDEX: 27.49 KG/M2

## 2023-07-25 DIAGNOSIS — Z01.419 WELL WOMAN EXAM WITH ROUTINE GYNECOLOGICAL EXAM: Primary | ICD-10-CM

## 2023-07-25 DIAGNOSIS — Z78.0 POSTMENOPAUSAL: ICD-10-CM

## 2023-07-25 DIAGNOSIS — N95.2 ATROPHIC VAGINITIS: ICD-10-CM

## 2023-07-25 DIAGNOSIS — M85.80 OSTEOPENIA, UNSPECIFIED LOCATION: Primary | ICD-10-CM

## 2023-07-25 RX ORDER — ESTRADIOL 0.1 MG/G
2 CREAM VAGINAL 3 TIMES WEEKLY
Qty: 42.5 G | Refills: 11 | Status: SHIPPED | OUTPATIENT
Start: 2023-07-26

## 2023-07-25 NOTE — PROGRESS NOTES
Gynecologic Annual Exam Note        GYN Annual Exam     CC - Here for annual exam.     Subjective     HPI  Preeti Jaren Guerra is a 68 y.o. female, , who presents for annual well woman exam as a(n) established patient.  She is postmenopausal.  Patient denies vaginal bleeding.    Patient reports urge urinary incontinence only when holding urine for a long time. Since her last visit she has had an ankle fracture.  Partner Status: Marital Status: .  She is not currently sexually active. STD testing recommendations have been explained to the patient and she does not desire STD testing.    Patient participates in the [place] ovarian screening program and had an ultrasound yesterday. She desires annual pap smears regardless of insurance coverage.     Additional OB/GYN History     She uses Estrace about once weekly for vaginal dryness.     Last Pap : . Results: negative. HPV: not done.     Last Completed Pap Smear            Ordered - PAP SMEAR (Every 3 Years) Ordered on 2022  SCANNED - PAP SMEAR    2021  SCANNED - PAP SMEAR    2020  Done - negative                  History of abnormal Pap smear: no  Family history of uterine, colon, breast, or ovarian cancer: yes - MGM had breast cancer, maternal uncle had colon cancer   Performs monthly Self-Breast Exam: yes  Last mammogram: yesterday. Done at [facility] and results are pending    Last Completed Mammogram            Ordered - MAMMOGRAM (Every 2 Years) Ordered on 10/17/2022      2022  Mammo Screening Digital Tomosynthesis Bilateral With CAD    2021  Mammo Screening Digital Tomosynthesis Bilateral With CAD    2020  Mammo Screening Digital Tomosynthesis Bilateral With CAD    2019  Mammo Screening Digital Tomosynthesis Bilateral With CAD    2018  Mammo screening digital tomosynthesis bilateral w CAD    Only the first 5 history entries have been loaded, but more history exists.                   Last colonoscopy: has had a colonoscopy 5 year(s) ago. (And was wnl)    Last Completed Colonoscopy            Ordered - COLORECTAL CANCER SCREENING (COLONOSCOPY - Every 10 Years) Ordered on 2018  SCANNED - COLONOSCOPY                  Last DEXA: today and revealed osteopenia  Exercises Regularly: not since she fractured her ankle four months ago  Feelings of Anxiety or Depression: no      Tobacco Usage?: No       Current Outpatient Medications:     Acetaminophen (TYLENOL EXTRA STRENGTH PO), Take 1-2 tablets by mouth As Needed., Disp: , Rfl:     aspirin tablet, Take 81 mg by mouth Daily., Disp: , Rfl:     Cholecalciferol (VITAMIN D) 2000 UNITS capsule, Take 1 capsule by mouth Daily., Disp: , Rfl:     Cyanocobalamin (Vitamin B 12) 500 MCG tablet, Take 1 tablet by mouth Daily., Disp: , Rfl:     [START ON 2023] estradiol (ESTRACE) 0.1 MG/GM vaginal cream, Insert 2 g into the vagina 3 (Three) Times a Week., Disp: 42.5 g, Rfl: 11    hydroCHLOROthiazide (HYDRODIURIL) 25 MG tablet, Take 12.5 mg by mouth Daily., Disp: , Rfl:     ketoconazole (NIZORAL) 2 % shampoo, Apply  topically., Disp: , Rfl:     levothyroxine (SYNTHROID, LEVOTHROID) 50 MCG tablet, Take 1 tablet by mouth Daily., Disp: , Rfl:     midodrine (PROAMATINE) 5 MG tablet, Take 1 tablet by mouth Daily., Disp: 30 tablet, Rfl: 3    Multiple Vitamins-Minerals (ICAPS AREDS 2 PO), Take 1 capsule by mouth Daily., Disp: , Rfl:     Omega-3 Fatty Acids (FISH OIL PO), Take 1 tablet by mouth Daily., Disp: , Rfl:     pentosan polysulfate (ELMIRON) 100 MG capsule, Take 1 capsule by mouth Daily., Disp: , Rfl:     rosuvastatin (CRESTOR) 40 MG tablet, Take 1 tablet by mouth Every Night., Disp: 90 tablet, Rfl: 0        OB History          3    Para   3    Term   3       0    AB   0    Living   3         SAB   0    IAB   0    Ectopic   0    Molar   0    Multiple   0    Live Births   3                Past Medical History:   Diagnosis  Date    Abnormal EKG     Celiac disease     diet-controlled    Colon polyp 20--    Coronary artery disease     one vessel    History of basal cell carcinoma (BCC) of skin 03/2012    removed from nose and right breast    Hypothyroidism     Interstitial cystitis     sees Dr. Acosta    Menopause     Mumps     Osteopenia     PONV (postoperative nausea and vomiting)     Wears glasses         Past Surgical History:   Procedure Laterality Date    ABDOMINOPLASTY      AUGMENTATION MAMMAPLASTY Bilateral     patient has since had implants removed    BASAL CELL CARCINOMA EXCISION  03/2012    removed from nose and R breast    BREAST SURGERY  2008    implants removed    CARDIAC CATHETERIZATION N/A 12/18/2020    Procedure: Left Heart Cath;  Surgeon: Fatoumtaa Jasso MD;  Location:  [facility];  Service: Cardiology;  Laterality: N/A;    COLONOSCOPY      CORONARY ARTERY BYPASS GRAFT N/A 01/05/2021    Procedure: CORONARY ARTERY BYPASS GRAFTING x 2 WITH INTERNAL MAMMARY ARTERY GRAFT, EVH FROM RIGHT GREATER SAPHENOUS VEIN, ROCÍO;  Surgeon: Ion Moody MD;  Location:  [facility];  Service: Cardiothoracic;  Laterality: N/A;  VO:0805  VR:0810    LIPOSUCTION      UPPER GASTROINTESTINAL ENDOSCOPY         Health Maintenance   Topic Date Due    HEPATITIS C SCREENING  Never done    ANNUAL WELLNESS VISIT  Never done    ZOSTER VACCINE (2 of 2) 06/10/2019    Pneumococcal Vaccine 65+ (1 - PCV) Never done    DXA SCAN  02/20/2021    COVID-19 Vaccine (3 - Pfizer series) 05/27/2021    LIPID PANEL  12/18/2021    INFLUENZA VACCINE  10/01/2023    MAMMOGRAM  05/27/2024    PAP SMEAR  05/05/2025    COLORECTAL CANCER SCREENING  08/30/2028    TDAP/TD VACCINES (3 - Td or Tdap) 04/09/2029       The additional following portions of the patient's history were reviewed and updated as appropriate: allergies, current medications, past family history, past medical history, past social history, past surgical history, and problem  "list.    Review of Systems   Respiratory: Negative.  Negative for shortness of breath.    Cardiovascular: Negative.  Negative for chest pain.   Gastrointestinal: Negative.  Negative for abdominal distention, abdominal pain and constipation.   Genitourinary:  Negative for breast discharge, breast lump, breast pain, dysuria, pelvic pain, pelvic pressure, urgency, urinary incontinence, vaginal bleeding, vaginal discharge and vaginal pain.   Psychiatric/Behavioral: Negative.  Negative for depressed mood. The patient is not nervous/anxious.        I have reviewed and agree with the HPI, ROS, and historical information as entered above. Calli Arndt, APRN           Objective   /60   Ht 162.6 cm (64\")   Wt 73 kg (161 lb)   LMP  (LMP Unknown) Comment: last mammo march 2019  BMI 27.64 kg/m²     Physical Exam  Vitals and nursing note reviewed. Exam conducted with a chaperone present.   Constitutional:       General: She is not in acute distress.     Appearance: Normal appearance. She is well-developed. She is not ill-appearing.   HENT:      Head: Normocephalic and atraumatic.   Neck:      Thyroid: No thyroid mass, thyromegaly or thyroid tenderness.   Cardiovascular:      Rate and Rhythm: Normal rate and regular rhythm.      Heart sounds: Normal heart sounds. No murmur heard.  Pulmonary:      Effort: Pulmonary effort is normal. No retractions.      Breath sounds: Normal breath sounds. No wheezing, rhonchi or rales.   Chest:      Chest wall: No mass or tenderness.   Breasts:     Breasts are symmetrical.      Right: Normal. No mass, nipple discharge, skin change or tenderness.      Left: Normal. No mass, nipple discharge, skin change or tenderness.       Abdominal:      Palpations: Abdomen is soft. Abdomen is not rigid. There is no mass.      Tenderness: There is no abdominal tenderness. There is no guarding or rebound.      Hernia: No hernia is present. There is no hernia in the left inguinal area or right " inguinal area.   Genitourinary:     General: Normal vulva.      Labia:         Right: No rash, tenderness or lesion.         Left: No rash, tenderness or lesion.       Vagina: Erythema (mild) present. No vaginal discharge, tenderness, bleeding or lesions.      Cervix: No cervical motion tenderness, discharge, friability, lesion, erythema or cervical bleeding.      Uterus: Normal. Not enlarged, not fixed and not tender.       Adnexa: Right adnexa normal and left adnexa normal.        Right: No mass or tenderness.          Left: No mass or tenderness.        Rectum: No external hemorrhoid.      Comments: Atrophic vaginal tissue noted.   Musculoskeletal:      Cervical back: Normal range of motion. No muscular tenderness.   Lymphadenopathy:      Upper Body:      Right upper body: No supraclavicular or axillary adenopathy.      Left upper body: No supraclavicular or axillary adenopathy.   Neurological:      Mental Status: She is alert and oriented to person, place, and time.   Psychiatric:         Mood and Affect: Mood normal.         Behavior: Behavior normal.          Assessment and Plan    Problem List Items Addressed This Visit          Genitourinary and Reproductive     Atrophic vaginitis    Relevant Medications    estradiol (ESTRACE) 0.1 MG/GM vaginal cream (Start on 7/26/2023)     Other Visit Diagnoses       Well woman exam with routine gynecological exam    -  Primary    Relevant Orders    LIQUID-BASED PAP SMEAR WITH HPV GENOTYPING IF ASCUS ([facility])            GYN annual well woman exam.   Scheduling colonoscopy this yr.   Thyroid managed by internal medicine.  Reviewed monthly self breast exams.  Instructed to call with lumps, pain, or breast discharge.  Yearly mammograms ordered.  Recommended use of Vitamin D and getting adequate calcium in her diet. (1500mg). Bone health information sheet given to patient.   Osteoporosis screening results reviewed with patient. DEXA performed today.   Reviewed exercise as  a preventative health measures.   Reviewed risks of ERT including increased risk of breast cancer, increased blood clots, increased heart disease.  Patient strongly desires to stay on or start ERT.  She understands we will use the lowest amount that adequately controls her symptoms. Estrace Rf sent to pharmacy.  Instructed on Kegal exercises and gave patient educational information. Urge incontinence only when holds urine.   Return in about 1 year (around 7/25/2024) for Annual physical or sooner if needed.    Calli Arndt, APRN  07/25/2023

## 2023-07-26 LAB — REF LAB TEST METHOD: NORMAL

## 2023-07-28 ENCOUNTER — TELEPHONE (OUTPATIENT)
Dept: CARDIOLOGY | Facility: CLINIC | Age: 68
End: 2023-07-28
Payer: MEDICARE

## 2023-07-28 DIAGNOSIS — I25.119 CORONARY ARTERY DISEASE INVOLVING NATIVE CORONARY ARTERY OF NATIVE HEART WITH ANGINA PECTORIS: ICD-10-CM

## 2023-07-28 DIAGNOSIS — E78.5 DYSLIPIDEMIA: Primary | ICD-10-CM

## 2023-07-28 RX ORDER — ROSUVASTATIN CALCIUM 40 MG/1
80 TABLET, COATED ORAL DAILY
Qty: 30 TABLET | Refills: 11 | Status: SHIPPED | OUTPATIENT
Start: 2023-07-28

## 2023-07-28 NOTE — TELEPHONE ENCOUNTER
Spoke with patient.  [facility] has reviewed her lab work and wants to increase her rosuvastatin.  Also, new lab work in three months.  I will call in prescription to her pharmacy and mail her lab work orders.  She verbalizes understanding.

## 2023-07-28 NOTE — TELEPHONE ENCOUNTER
----- Message from Fatoumata Jasso MD sent at 7/13/2023  1:08 PM EDT -----  LDL is 104 and she has known coronary disease and prior CABG.  Increase rosuvastatin to 40 mg daily and repeat FLP in LFTs in 3 months  ----- Message -----  From: Meghan Candelario  Sent: 6/27/2023  12:29 PM EDT  To: Fatoumata Jasso MD

## 2023-08-09 NOTE — PROGRESS NOTES
[facility] Cardiology    Patient ID: Preeti Guerra is a 68 y.o. female.  : 1955   Contact: [phone]    Encounter date: 08/10/2023    PCP: Elaine Marcos MD      Chief complaint:   Chief Complaint   Patient presents with    Follow-up     Coronary artery disease involving native coronary artery of native heart without angina pectoris       Problem List:  Coronary artery disease  Echocardiogram 16, ZURI Sethi MD revealing NL LVEF 55-60%, mild MR, mild AR, trace TR.  Normal Carotid Duplex 16  Stress cardiolite 2020: mildly reduced exercise tolerance, stopped exercise due to fatigue, SOA. area of mild fixed apical hypoperfusion was seen which was actually worse in the resting images and felt to be a normal variant. The 3D reconstruction showed normal contractility in all segments. The calculated ejection fraction of 68%. No left ventricular  dilation was seen with stress.  LHC, 2020: Significant left main disease by IFR. Noncritical disease in the LAD circumflex and RCA. Normal LV systolic function wall motion.  Carotid duplex, 2020: ALVINO and LICA stenoses of 0-49%. No carotid plaque seen. Mild intimal thickening noted bilaterally.  Echocardiogram, 2020: EF 60%. Mild AR. Trace to mild MR. Trace TR.   ROCÍO, 2021; EF 55%.   CABG x 2 by Dr. Moody on 01/15/2021.  Abnormal EKG 20.  Hyperlipidemia- diet controlled  GERD   Anxiety  Celiac Disease    No Known Allergies    Current Medications:    Current Outpatient Medications:     Acetaminophen (TYLENOL EXTRA STRENGTH PO), Take 1-2 tablets by mouth As Needed., Disp: , Rfl:     aspirin tablet, Take 81 mg by mouth Daily., Disp: , Rfl:     Cholecalciferol (VITAMIN D) 2000 UNITS capsule, Take 1 capsule by mouth Daily., Disp: , Rfl:     Cyanocobalamin (Vitamin B 12) 500 MCG tablet, Take 1 tablet by mouth Daily., Disp: , Rfl:     estradiol (ESTRACE) 0.1 MG/GM vaginal cream, Insert  2 g into the vagina 3 (Three) Times a Week., Disp: 42.5 g, Rfl: 11    ketoconazole (NIZORAL) 2 % shampoo, Apply  topically., Disp: , Rfl:     levothyroxine (SYNTHROID, LEVOTHROID) 50 MCG tablet, Take 1 tablet by mouth Daily., Disp: , Rfl:     midodrine (PROAMATINE) 5 MG tablet, Take 1 tablet by mouth Daily. (Patient taking differently: Take 1 tablet by mouth As Needed.), Disp: 30 tablet, Rfl: 3    Multiple Vitamins-Minerals (ICAPS AREDS 2 PO), Take 1 capsule by mouth Daily., Disp: , Rfl:     Omega-3 Fatty Acids (FISH OIL PO), Take 1 tablet by mouth Daily., Disp: , Rfl:     pentosan polysulfate (ELMIRON) 100 MG capsule, Take 1 capsule by mouth Daily., Disp: , Rfl:     rosuvastatin (CRESTOR) 40 MG tablet, Take 1 tablet by mouth Daily., Disp: 30 tablet, Rfl: 11    HPI    Preeti Guerra is a 68 y.o. female who presents today for a follow up of coronary artery disease and cardiac risk factors. Since last visit, patient has been doing well overall from a cardiovascular standpoint. Patient does not maintain a regular activity level due to breaking her ankle 3 months ago but is starting to walk again. She states she was lightheaded this past Friday while sitting down but it passed after a few moments. Patient reports prior to that episode, midodrine caused her blood pressure to increase so she decreased her amount to 1/2 a tablet daily before completely weaning herself off 2 weeks ago. She states that her last cholesterol reading was elevated due to others cooking for her while her ankle was broken. Patient denies chest pain, shortness of breath, orthopnea, palpitations, edema, and syncope.       The following portions of the patient's history were reviewed and updated as appropriate: allergies, current medications and problem list.    Pertinent positives as listed in the HPI.  All other systems reviewed are negative.         Vitals:    08/10/23 1422   BP: 108/62   BP Location: Left arm   Patient Position:  "Sitting   Cuff Size: Adult   Pulse: 67   Resp: 18   SpO2: 98%   Weight: 73.5 kg (162 lb)   Height: 162.6 cm (64\")       Physical Exam:  General: Alert and oriented.  Neck: Jugular venous pressure is within normal limits. Carotids have normal upstrokes without bruits.   Cardiovascular: Heart has a nondisplaced focal PMI. Regular rate and rhythm. No murmur, gallop or rub.  Lungs: Clear, no rales or wheezes. Equal expansion is noted.   Extremities: Show no edema.  Skin: Warm and dry.  Neurologic: Nonfocal.     Diagnostic Data (reviewed with patient):  Lab date: 06/18/2023  FLP: , , HDL 53,   CMP: Glu 105, BUN 9, Creat 0.99, eGFR 55.8, Na 142, K 4.2, Cl 105, CO2 30.9, Ca 9.7, Alk Phos 112, AST 13, ALT 18  TSH: 1.832    Advance Care Planning   ACP discussion was declined by the patient. Patient does not have an advance directive, declines further assistance.           ECG 12 Lead    Date/Time: 8/10/2023 2:39 PM  Performed by: Fatoumata Jasso MD  Authorized by: Fatoumata Jasso MD   Comparison: compared with previous ECG from 1/7/2021  Similar to previous ECG  Comparison to previous ECG: Abnormal ECG  Rhythm: sinus rhythm  BPM: 67  Other findings: non-specific ST-T wave changes    Clinical impression: abnormal EKG          Assessment:    ICD-10-CM ICD-9-CM   1. Coronary artery disease involving native coronary artery of native heart with angina pectoris  I25.119 414.01     413.9   2. S/P CABG (coronary artery bypass graft)  Z95.1 V45.81   3. Dyslipidemia  E78.5 272.4         Plan:  Discontinue HCTZ due to dizziness.   Continue on aspirin 81 mg for antiplatelet therapy.   Continue on Omega-3 fatty acids for hyperlipidemia.  Continue on rosuvastatin 40 mg daily for hyperlipidemia.   Continue all other current medications.  F/up in 12 months, sooner if needed.      Scribed for Fatoumata Jasso MD by She Hernandez. 8/10/2023 14:33 EDT    I Fatoumata Jasso MD personally performed the " services described in this documentation as scribed by the above individual in my presence, and it is both accurate and complete.    Fatoumata Jasso MD, FACC

## 2023-08-10 ENCOUNTER — OFFICE VISIT (OUTPATIENT)
Dept: CARDIOLOGY | Facility: CLINIC | Age: 68
End: 2023-08-10
Payer: MEDICARE

## 2023-08-10 ENCOUNTER — HOSPITAL ENCOUNTER (OUTPATIENT)
Dept: MAMMOGRAPHY | Facility: HOSPITAL | Age: 68
Discharge: HOME | End: 2023-08-10
Payer: MEDICARE

## 2023-08-10 ENCOUNTER — TELEPHONE (OUTPATIENT)
Dept: CARDIOLOGY | Facility: CLINIC | Age: 68
End: 2023-08-10

## 2023-08-10 ENCOUNTER — HOSPITAL ENCOUNTER (OUTPATIENT)
Dept: ULTRASOUND IMAGING | Facility: HOSPITAL | Age: 68
Discharge: HOME | End: 2023-08-10
Payer: MEDICARE

## 2023-08-10 VITALS
HEART RATE: 67 BPM | RESPIRATION RATE: 18 BRPM | SYSTOLIC BLOOD PRESSURE: 108 MMHG | OXYGEN SATURATION: 98 % | DIASTOLIC BLOOD PRESSURE: 62 MMHG | BODY MASS INDEX: 27.66 KG/M2 | WEIGHT: 162 LBS | HEIGHT: 64 IN

## 2023-08-10 DIAGNOSIS — R92.8 ABNORMAL MAMMOGRAM: ICD-10-CM

## 2023-08-10 DIAGNOSIS — E78.5 DYSLIPIDEMIA: ICD-10-CM

## 2023-08-10 DIAGNOSIS — I25.119 CORONARY ARTERY DISEASE INVOLVING NATIVE CORONARY ARTERY OF NATIVE HEART WITH ANGINA PECTORIS: Primary | ICD-10-CM

## 2023-08-10 DIAGNOSIS — Z95.1 S/P CABG (CORONARY ARTERY BYPASS GRAFT): ICD-10-CM

## 2023-08-10 PROCEDURE — 76642 ULTRASOUND BREAST LIMITED: CPT | Performed by: RADIOLOGY

## 2023-08-10 PROCEDURE — 77066 DX MAMMO INCL CAD BI: CPT

## 2023-08-10 PROCEDURE — G0279 TOMOSYNTHESIS, MAMMO: HCPCS | Performed by: RADIOLOGY

## 2023-08-10 PROCEDURE — 99214 OFFICE O/P EST MOD 30 MIN: CPT | Performed by: INTERNAL MEDICINE

## 2023-08-10 PROCEDURE — 77066 DX MAMMO INCL CAD BI: CPT | Performed by: RADIOLOGY

## 2023-08-10 PROCEDURE — G0279 TOMOSYNTHESIS, MAMMO: HCPCS

## 2023-08-10 PROCEDURE — 1160F RVW MEDS BY RX/DR IN RCRD: CPT | Performed by: INTERNAL MEDICINE

## 2023-08-10 PROCEDURE — 76642 ULTRASOUND BREAST LIMITED: CPT

## 2023-08-10 PROCEDURE — 93000 ELECTROCARDIOGRAM COMPLETE: CPT | Performed by: INTERNAL MEDICINE

## 2023-08-10 PROCEDURE — 1159F MED LIST DOCD IN RCRD: CPT | Performed by: INTERNAL MEDICINE

## 2023-08-10 RX ORDER — ROSUVASTATIN CALCIUM 40 MG/1
40 TABLET, COATED ORAL DAILY
Qty: 30 TABLET | Refills: 11
Start: 2023-08-10

## 2023-08-16 ENCOUNTER — TRANSCRIBE ORDERS (OUTPATIENT)
Dept: ADMINISTRATIVE | Facility: HOSPITAL | Age: 68
End: 2023-08-16
Payer: MEDICARE

## 2023-08-16 DIAGNOSIS — Z12.31 SCREENING MAMMOGRAM FOR BREAST CANCER: Primary | ICD-10-CM

## 2023-09-22 ENCOUNTER — TRANSCRIBE ORDERS (OUTPATIENT)
Dept: ADMINISTRATIVE | Facility: HOSPITAL | Age: 68
End: 2023-09-22
Payer: MEDICARE

## 2023-09-22 DIAGNOSIS — Z12.31 VISIT FOR SCREENING MAMMOGRAM: Primary | ICD-10-CM

## 2023-10-09 ENCOUNTER — OUTSIDE FACILITY SERVICE (OUTPATIENT)
Dept: GASTROENTEROLOGY | Facility: CLINIC | Age: 68
End: 2023-10-09
Payer: MEDICARE

## 2023-10-09 PROCEDURE — 45385 COLONOSCOPY W/LESION REMOVAL: CPT | Performed by: INTERNAL MEDICINE

## 2023-10-09 PROCEDURE — 88305 TISSUE EXAM BY PATHOLOGIST: CPT | Performed by: INTERNAL MEDICINE

## 2023-10-10 ENCOUNTER — LAB REQUISITION (OUTPATIENT)
Dept: LAB | Facility: HOSPITAL | Age: 68
End: 2023-10-10
Payer: MEDICARE

## 2023-10-10 DIAGNOSIS — D12.0 BENIGN NEOPLASM OF CECUM: ICD-10-CM

## 2023-10-10 DIAGNOSIS — D12.2 BENIGN NEOPLASM OF ASCENDING COLON: ICD-10-CM

## 2023-10-10 DIAGNOSIS — Z12.11 ENCOUNTER FOR SCREENING FOR MALIGNANT NEOPLASM OF COLON: ICD-10-CM

## 2023-10-10 DIAGNOSIS — Z80.0 FAMILY HISTORY OF MALIGNANT NEOPLASM OF DIGESTIVE ORGANS: ICD-10-CM

## 2023-10-10 DIAGNOSIS — K64.8 OTHER HEMORRHOIDS: ICD-10-CM

## 2023-10-10 DIAGNOSIS — Z83.719 FAMILY HISTORY OF COLON POLYPS, UNSPECIFIED: ICD-10-CM

## 2023-10-11 LAB
CYTO UR: NORMAL
LAB AP CASE REPORT: NORMAL
LAB AP CLINICAL INFORMATION: NORMAL
PATH REPORT.FINAL DX SPEC: NORMAL
PATH REPORT.GROSS SPEC: NORMAL

## 2024-04-11 ENCOUNTER — TELEPHONE (OUTPATIENT)
Dept: CARDIOLOGY | Facility: CLINIC | Age: 69
End: 2024-04-11
Payer: MEDICARE

## 2024-04-11 RX ORDER — HYDROCHLOROTHIAZIDE 25 MG/1
25 TABLET ORAL DAILY
Start: 2024-04-11

## 2024-04-11 NOTE — TELEPHONE ENCOUNTER
"Patient left voice mail message requesting a return call.  Spoke with patient.  She states she has been off of midodrine for \"months and months\".  She states she has lost 6 pounds.  She hasn't noted any symptoms until recently.  She has had 2-3 episodes of lightheadedness.  Medications reviewed.  PCP had placed the patient on HCTZ 25mg daily.  This medication added to patients medication list.  She is advised that this could drop her blood pressure and if absolutely needed she might need to take a midodrine.  She is advised to half the dose of HCTZ and monitor her blood pressure.  If her blood pressure continues to be low - she is to call the office back.  She verbalizes understanding.  "

## 2024-04-22 ENCOUNTER — TELEPHONE (OUTPATIENT)
Dept: CARDIOLOGY | Facility: CLINIC | Age: 69
End: 2024-04-22
Payer: MEDICARE

## 2024-04-22 RX ORDER — MIDODRINE HYDROCHLORIDE 5 MG/1
2.5 TABLET ORAL DAILY
Qty: 45 TABLET | Refills: 3 | Status: SHIPPED | OUTPATIENT
Start: 2024-04-22

## 2024-04-22 NOTE — TELEPHONE ENCOUNTER
Patient left voice mail message asking for a return call.  She states she has blood pressure readings for me.  Spoke with patient.  She reports her blood pressure has been running 105-110 systolic without any symptoms.  She is encouraged to continue all medication as ordered.  She verbalizes understanding.

## 2024-08-05 ENCOUNTER — OFFICE VISIT (OUTPATIENT)
Dept: OBSTETRICS AND GYNECOLOGY | Facility: CLINIC | Age: 69
End: 2024-08-05
Payer: MEDICARE

## 2024-08-05 VITALS
DIASTOLIC BLOOD PRESSURE: 60 MMHG | BODY MASS INDEX: 26.84 KG/M2 | WEIGHT: 157.2 LBS | SYSTOLIC BLOOD PRESSURE: 98 MMHG | HEIGHT: 64 IN

## 2024-08-05 DIAGNOSIS — N95.1 MENOPAUSAL SYMPTOMS: ICD-10-CM

## 2024-08-05 DIAGNOSIS — N95.8 OTHER SPECIFIED MENOPAUSAL AND PERIMENOPAUSAL DISORDERS: ICD-10-CM

## 2024-08-05 DIAGNOSIS — N95.2 ATROPHIC VAGINITIS: Primary | ICD-10-CM

## 2024-08-05 RX ORDER — ESTRADIOL 0.1 MG/G
2 CREAM VAGINAL 3 TIMES WEEKLY
Qty: 42.5 G | Refills: 11 | Status: SHIPPED | OUTPATIENT
Start: 2024-08-05

## 2024-08-05 NOTE — PROGRESS NOTES
Postmenopausal visit    Chief Complaint   Patient presents with    Postmenopausal Visit        Subjective     HPI  Preeti Guerra is a 69 y.o. female, , who presents as a(n) established patient. She is postmenopausal. There were no changes to her medical or surgical history since her last visit.. Marital Status: .  She is not currently sexually active. STD testing recommendations have been explained to the patient and she declines STD testing.    Patient reports problems with:  none .  Pt. reports no urinary incontinence.     Additional OB/GYN History     On HRT? Yes. Details: Estrace cream     Last Pap : 23. Results: negative. HPV:  not done . Pt would like pap smear despite guidelines.     Last Completed Pap Smear            Ordered - PAP SMEAR (Every 3 Years) Ordered on 2023  LIQUID-BASED PAP SMEAR WITH HPV GENOTYPING IF ASCUS (CHAPIS,COR,MAD)    2022  SCANNED - PAP SMEAR    2021  SCANNED - PAP SMEAR    2020  Done - negative                  History of abnormal Pap smear: no  Family history of uterine, colon, breast, or ovarian cancer: yes - MGM had breast cancer, maternal uncle had colon cancer   Performs monthly Self-Breast Exam: yes  Last mammogram: 8/10/23. Done at .    Last Completed Mammogram            Scheduled - MAMMOGRAM (Every 2 Years) Scheduled for 2024      08/10/2023  Mammo Diagnostic Digital Tomosynthesis Bilateral With CAD    2023  Mammo Screening Digital Tomosynthesis Bilateral With CAD    2022  Mammo Screening Digital Tomosynthesis Bilateral With CAD    2021  Mammo Screening Digital Tomosynthesis Bilateral With CAD    2020  Mammo Screening Digital Tomosynthesis Bilateral With CAD    Only the first 5 history entries have been loaded, but more history exists.                  Last colonoscopy: has had a colonoscopy 1 years ago, WNL    Last Completed Colonoscopy            COLORECTAL  CANCER SCREENING (COLONOSCOPY - Every 10 Years) Next due on 10/9/2033      10/09/2023  SCANNED - COLONOSCOPY    2018  SCANNED - COLONOSCOPY                  Her last bone density scan was 1 year ago and results were Osteopenia  Exercises Regularly: no  Feelings of Anxiety or Depression: no    Tobacco Usage?: No       Current Outpatient Medications:     aspirin tablet, Take 81 mg by mouth Daily., Disp: , Rfl:     Cholecalciferol (VITAMIN D) 2000 UNITS capsule, Take 1 capsule by mouth Daily., Disp: , Rfl:     estradiol (ESTRACE) 0.1 MG/GM vaginal cream, Insert 2 g into the vagina 3 (Three) Times a Week., Disp: 42.5 g, Rfl: 11    hydroCHLOROthiazide 25 MG tablet, Take 1 tablet by mouth Daily., Disp: , Rfl:     ketoconazole (NIZORAL) 2 % shampoo, Apply  topically., Disp: , Rfl:     levothyroxine (SYNTHROID, LEVOTHROID) 50 MCG tablet, Take 1 tablet by mouth Daily., Disp: , Rfl:     midodrine (PROAMATINE) 5 MG tablet, Take 0.5 tablets by mouth Daily., Disp: 45 tablet, Rfl: 3    Multiple Vitamins-Minerals (ICAPS AREDS 2 PO), Take 1 capsule by mouth Daily., Disp: , Rfl:     Omega-3 Fatty Acids (FISH OIL PO), Take 1 tablet by mouth Daily., Disp: , Rfl:     pentosan polysulfate (ELMIRON) 100 MG capsule, Take 1 capsule by mouth Daily., Disp: , Rfl:     rosuvastatin (CRESTOR) 40 MG tablet, Take 1 tablet by mouth Daily., Disp: 30 tablet, Rfl: 11    Sodium Sulfate-Mag Sulfate-KCl 9562-710-767 MG tablet, Take 24 tablets by mouth Take As Directed., Disp: 24 tablet, Rfl: 0    Patient is requesting refills of Estrace cream.    OB History          3    Para   3    Term   3       0    AB   0    Living   3         SAB   0    IAB   0    Ectopic   0    Molar   0    Multiple   0    Live Births   3                Past Medical History:   Diagnosis Date    Abnormal EKG     Celiac disease     diet-controlled    Colon polyp 20--    Coronary artery disease     one vessel    History of basal cell carcinoma (BCC) of skin  03/2012    removed from nose and right breast    Hypothyroidism     Interstitial cystitis     sees Dr. Acosta    Menopause     Mumps     Osteopenia     PONV (postoperative nausea and vomiting)     Wears glasses         Past Surgical History:   Procedure Laterality Date    ABDOMINOPLASTY      AUGMENTATION MAMMAPLASTY Bilateral     patient has since had implants removed    BASAL CELL CARCINOMA EXCISION  03/2012    removed from nose and R breast    BREAST SURGERY  2008    implants removed    CARDIAC CATHETERIZATION N/A 12/18/2020    Procedure: Left Heart Cath;  Surgeon: Fatoumata Jasso MD;  Location:  [facility];  Service: Cardiology;  Laterality: N/A;    COLONOSCOPY      CORONARY ARTERY BYPASS GRAFT N/A 01/05/2021    Procedure: CORONARY ARTERY BYPASS GRAFTING x 2 WITH INTERNAL MAMMARY ARTERY GRAFT, EVH FROM RIGHT GREATER SAPHENOUS VEIN, ROCÍO;  Surgeon: Ion Moody MD;  Location:  [facility];  Service: Cardiothoracic;  Laterality: N/A;  VO:[id number]  VR:[id number]    LIPOSUCTION      UPPER GASTROINTESTINAL ENDOSCOPY         Health Maintenance   Topic Date Due    BMI FOLLOWUP  Never done    HEPATITIS C SCREENING  Never done    ZOSTER VACCINE (2 of 2) 06/10/2019    Pneumococcal Vaccine 65+ (1 of 1 - PCV) Never done    LIPID PANEL  12/18/2021    COVID-19 Vaccine (3 - 2023-24 season) 09/01/2023    INFLUENZA VACCINE  08/01/2024    ANNUAL WELLNESS VISIT  12/06/2024    DXA SCAN  07/25/2025    MAMMOGRAM  08/10/2025    PAP SMEAR  07/25/2026    TDAP/TD VACCINES (3 - Td or Tdap) 04/09/2029    COLORECTAL CANCER SCREENING  10/09/2033       The additional following portions of the patient's history were reviewed and updated as appropriate: allergies, current medications, past family history, past medical history, past social history, past surgical history, and problem list.    Review of Systems   Constitutional: Negative.    HENT: Negative.     Eyes: Negative.    Respiratory: Negative.     Cardiovascular:  "Negative.    Gastrointestinal: Negative.    Endocrine: Negative.    Genitourinary: Negative.    Musculoskeletal: Negative.    Skin: Negative.    Allergic/Immunologic: Negative.    Neurological: Negative.    Hematological: Negative.    Psychiatric/Behavioral: Negative.         I have reviewed and agree with the HPI, ROS, and historical information as entered above. Ping Elias MD           Objective   BP 98/60   Ht 162.6 cm (64\")   Wt 71.3 kg (157 lb 3.2 oz)   LMP  (LMP Unknown) Comment: last mammo march 2019  BMI 26.98 kg/m²     Physical Exam  Vitals and nursing note reviewed. Exam conducted with a chaperone present.   Constitutional:       Appearance: She is well-developed.   HENT:      Head: Normocephalic and atraumatic.   Eyes:      Pupils: Pupils are equal, round, and reactive to light.   Neck:      Thyroid: No thyroid mass or thyromegaly.   Pulmonary:      Effort: Pulmonary effort is normal. No retractions.   Chest:      Chest wall: No mass.   Breasts:     Right: Normal. No mass, nipple discharge, skin change or tenderness.      Left: Normal. No mass, nipple discharge, skin change or tenderness.   Abdominal:      General: Bowel sounds are normal.      Palpations: Abdomen is soft. Abdomen is not rigid. There is no mass.      Tenderness: There is no abdominal tenderness. There is no guarding.      Hernia: No hernia is present. There is no hernia in the left inguinal area or right inguinal area.   Genitourinary:     Exam position: Lithotomy position.      Pubic Area: No rash.       Labia:         Right: No rash, tenderness or lesion.         Left: No rash, tenderness or lesion.       Urethra: No urethral pain or urethral swelling.      Vagina: Normal. No vaginal discharge or lesions.      Cervix: No cervical motion tenderness, discharge, lesion or cervical bleeding.      Uterus: Normal. Not enlarged, not fixed and not tender.       Adnexa:         Right: No mass, tenderness or fullness.          Left: No " mass, tenderness or fullness.        Rectum: No external hemorrhoid.   Musculoskeletal:      Cervical back: Normal range of motion. No muscular tenderness.      Right lower leg: No edema.      Left lower leg: No edema.   Skin:     General: Skin is warm and dry.   Neurological:      Mental Status: She is alert and oriented to person, place, and time.      Motor: Motor function is intact.   Psychiatric:         Mood and Affect: Mood and affect normal.         Behavior: Behavior normal.         Assessment and Plan         Problem List Items Addressed This Visit          Gynecologic and Obstetric Problems    Atrophic vaginitis - Primary    Relevant Medications    estradiol (ESTRACE) 0.1 MG/GM vaginal cream    Other Relevant Orders    LIQUID-BASED PAP SMEAR WITH HPV GENOTYPING IF ASCUS (CHAPIS,COR,MAD)    DEXA Bone Density Axial       Other    Menopausal symptoms    Relevant Orders    DEXA Bone Density Axial     Other Visit Diagnoses       Other specified menopausal and perimenopausal disorders        Relevant Orders    DEXA Bone Density Axial            Reviewed monthly self breast exams.  Instructed to call with lumps, pain, or breast discharge.  Yearly mammograms ordered.  Recommended use of Vitamin D and getting adequate calcium in her diet. (1500mg)  Osteoporosis screening ordered today.  Reviewed exercise as a preventative health measures.   RTC in 1 year or PRN with problems.  Return in about 1 year (around 8/5/2025) for Annual physical, Schedule DEXA with next annual.    Ping Elias MD  08/05/2024

## 2024-08-06 RX ORDER — ROSUVASTATIN CALCIUM 40 MG/1
40 TABLET, COATED ORAL DAILY
Qty: 180 TABLET | Refills: 3 | Status: SHIPPED | OUTPATIENT
Start: 2024-08-06

## 2024-08-09 LAB — REF LAB TEST METHOD: NORMAL

## 2024-08-21 ENCOUNTER — OFFICE VISIT (OUTPATIENT)
Dept: CARDIOLOGY | Facility: CLINIC | Age: 69
End: 2024-08-21
Payer: MEDICARE

## 2024-08-21 ENCOUNTER — HOSPITAL ENCOUNTER (OUTPATIENT)
Dept: MAMMOGRAPHY | Facility: HOSPITAL | Age: 69
Discharge: HOME | End: 2024-08-21
Admitting: OBSTETRICS & GYNECOLOGY
Payer: MEDICARE

## 2024-08-21 VITALS
HEIGHT: 64 IN | DIASTOLIC BLOOD PRESSURE: 70 MMHG | SYSTOLIC BLOOD PRESSURE: 132 MMHG | HEART RATE: 64 BPM | WEIGHT: 158.4 LBS | BODY MASS INDEX: 27.04 KG/M2 | OXYGEN SATURATION: 98 %

## 2024-08-21 DIAGNOSIS — I25.810 CORONARY ARTERY DISEASE INVOLVING CORONARY BYPASS GRAFT OF NATIVE HEART WITHOUT ANGINA PECTORIS: Primary | ICD-10-CM

## 2024-08-21 DIAGNOSIS — E78.5 DYSLIPIDEMIA: ICD-10-CM

## 2024-08-21 DIAGNOSIS — I95.1 ORTHOSTASIS: ICD-10-CM

## 2024-08-21 DIAGNOSIS — Z12.31 VISIT FOR SCREENING MAMMOGRAM: ICD-10-CM

## 2024-08-21 PROCEDURE — 93000 ELECTROCARDIOGRAM COMPLETE: CPT | Performed by: PHYSICIAN ASSISTANT

## 2024-08-21 PROCEDURE — 77067 SCR MAMMO BI INCL CAD: CPT

## 2024-08-21 PROCEDURE — 1160F RVW MEDS BY RX/DR IN RCRD: CPT | Performed by: PHYSICIAN ASSISTANT

## 2024-08-21 PROCEDURE — 77063 BREAST TOMOSYNTHESIS BI: CPT

## 2024-08-21 PROCEDURE — 99214 OFFICE O/P EST MOD 30 MIN: CPT | Performed by: PHYSICIAN ASSISTANT

## 2024-08-21 PROCEDURE — 1159F MED LIST DOCD IN RCRD: CPT | Performed by: PHYSICIAN ASSISTANT

## 2024-08-21 NOTE — PROGRESS NOTES
[facility] Cardiology    Patient ID: Preeti Guerra is a 69 y.o. female.  : 1955   Contact: [phone]    Encounter date: 2024    PCP: Elaine Marcos MD      Chief complaint:   Chief Complaint   Patient presents with    Coronary artery disease involving native coronary artery of       Problem List:  Coronary artery disease  Echocardiogram 16, ZURI Sethi MD revealing NL LVEF 55-60%, mild MR, mild AR, trace TR.  Normal Carotid Duplex 16  Stress cardiolite 2020: mildly reduced exercise tolerance, stopped exercise due to fatigue, SOA. area of mild fixed apical hypoperfusion was seen which was actually worse in the resting images and felt to be a normal variant. The 3D reconstruction showed normal contractility in all segments. The calculated ejection fraction of 68%. No left ventricular  dilation was seen with stress.  LHC, 2020: Significant left main disease by IFR. Noncritical disease in the LAD circumflex and RCA. Normal LV systolic function wall motion.  Carotid duplex, 2020: ALVINO and LICA stenoses of 0-49%. No carotid plaque seen. Mild intimal thickening noted bilaterally.  Echocardiogram, 2020: EF 60%. Mild AR. Trace to mild MR. Trace TR.   ROCÍO, 2021; EF 55%.   CABG x 2 by Dr. Moody on 01/15/2021.  Abnormal EKG 20.  Hyperlipidemia- diet controlled  GERD   Anxiety  Celiac Disease    No Known Allergies    Current Medications:    Current Outpatient Medications:     aspirin tablet, Take 81 mg by mouth Daily., Disp: , Rfl:     Cholecalciferol (VITAMIN D) 2000 UNITS capsule, Take 1 capsule by mouth Daily., Disp: , Rfl:     estradiol (ESTRACE) 0.1 MG/GM vaginal cream, Insert 2 g into the vagina 3 (Three) Times a Week., Disp: 42.5 g, Rfl: 11    ketoconazole (NIZORAL) 2 % shampoo, Apply  topically., Disp: , Rfl:     levothyroxine (SYNTHROID, LEVOTHROID) 50 MCG tablet, Take 1 tablet by mouth Daily., Disp: , Rfl:      midodrine (PROAMATINE) 5 MG tablet, Take 0.5 tablets by mouth Daily., Disp: 45 tablet, Rfl: 3    Omega-3 Fatty Acids (FISH OIL PO), Take 1 tablet by mouth Daily., Disp: , Rfl:     rosuvastatin (CRESTOR) 40 MG tablet, Take 1 tablet by mouth Daily., Disp: 180 tablet, Rfl: 3    Multiple Vitamins-Minerals (ICAPS AREDS 2 PO), Take 1 capsule by mouth Daily. (Patient not taking: Reported on 8/21/2024), Disp: , Rfl:     pentosan polysulfate (ELMIRON) 100 MG capsule, Take 1 capsule by mouth Daily., Disp: , Rfl:     Sodium Sulfate-Mag Sulfate-KCl 4750-230-324 MG tablet, Take 24 tablets by mouth Take As Directed. (Patient not taking: Reported on 8/21/2024), Disp: 24 tablet, Rfl: 0    HPI    Preeti Guerra is a 69 y.o. female who presents today for a follow up of CAD, hyperlipidemia, orthostasis and cardiac risk factors. Since last visit, patient has had some dizziness on and off.  At her last appointment hydrochlorothiazide was stopped and she states it seemed to help a little bit but this summer she had episodes where she was getting a little dizzy.  She thought that it was mostly correlating with the hot weather.  She has noticed recently that with walking a long distance she could feel some lightheadedness but has not had any syncopal episodes.  While taking midodrine this has helped.  She does check her blood pressure regularly and it is in the 110s.  She is not having any anginal symptoms that she had prior to her heart cath and bypass surgery.  She does admit that she could consume more water and may not be staying well-hydrated.  Patient has not noticed any lower extremity edema or orthopnea.    Patient also states that she is having some pain in her low back and down her right leg.  She is wondering if it correlates with her increased dose of Crestor.    The following portions of the patient's history were reviewed and updated as appropriate: allergies, current medications and problem list.    Pertinent  "positives as listed in the HPI.  All other systems reviewed are negative.         Vitals:    08/21/24 1302   BP: 132/70   BP Location: Left arm   Patient Position: Sitting   Cuff Size: Adult   Pulse: 64   SpO2: 98%   Weight: 71.8 kg (158 lb 6.4 oz)   Height: 162.6 cm (64\")       Physical Exam:  General: Alert and oriented.  Neck: Jugular venous pressure is within normal limits. Carotids have normal upstrokes without bruits.   Cardiovascular: Regular rate and rhythm. No murmur, gallop or rub.  Lungs: Clear, no rales or wheezes. Equal expansion is noted.   Extremities: No peripheral edema  Skin: Warm and dry.  Neurologic: Nonfocal.     Diagnostic Data (reviewed with patient):  Lab date: 04/21/2024  FLP: , TG 79, HDL 63, LDL 60  CMP: Glu 97, BUN 13, Creat 1.14, eGFR 47.3, Na 142, K 4.1, Cl 106, CO2 29.3, Ca 9.5, Alk Phos 103, AST 13, ALT 19    Advance Care Planning   ACP discussion was declined by the patient. Patient does not have an advance directive, declines further assistance.           ECG 12 Lead    Date/Time: 8/21/2024 1:23 PM  Performed by: Lis Howell PA-C    Authorized by: Lis Howell PA-C  Comparison: compared with previous ECG from 8/10/2023  Similar to previous ECG  Rhythm: sinus rhythm  BPM: 64    Clinical impression: normal ECG            Assessment:    ICD-10-CM ICD-9-CM   1. Coronary artery disease involving coronary bypass graft of native heart without angina pectoris  I25.810 414.05   2. Dyslipidemia  E78.5 272.4   3. Orthostasis  I95.1 458.0         Plan:  Stable cardiac status.  Patient is encouraged to increase her exercise to build up her exercise tolerance.  I discussed with the patient her symptoms do not seem consistent with myalgias related to statin therapy however she can hold her Crestor for 5 days and see if this helps with her symptoms.  If it does then we will reduce her dose back to Crestor 20 mg daily.  She will need to have her lipid panel rechecked and if she is still " not to goal then she may need to have Zetia added if she cannot tolerate Crestor 40 mg.  Continue on aspirin 81 mg for antiplatelet therapy.   Continue on midodrine 2.5 mg daily for hypotension.   Continue on Omega-3 fatty acids for hyperlipidemia.  Continue all other current medications.  F/up in 12 months, sooner if needed.    Lis Howell PA-C

## 2024-12-23 RX ORDER — ROSUVASTATIN CALCIUM 40 MG/1
40 TABLET, COATED ORAL DAILY
Qty: 90 TABLET | Refills: 3 | Status: SHIPPED | OUTPATIENT
Start: 2024-12-23

## 2025-03-31 RX ORDER — MIDODRINE HYDROCHLORIDE 5 MG/1
2.5 TABLET ORAL DAILY
Qty: 45 TABLET | Refills: 3 | Status: SHIPPED | OUTPATIENT
Start: 2025-03-31

## 2025-07-21 ENCOUNTER — TRANSCRIBE ORDERS (OUTPATIENT)
Dept: OBSTETRICS AND GYNECOLOGY | Facility: CLINIC | Age: 70
End: 2025-07-21
Payer: COMMERCIAL

## 2025-07-21 DIAGNOSIS — Z12.31 VISIT FOR SCREENING MAMMOGRAM: Primary | ICD-10-CM

## 2025-08-07 ENCOUNTER — OFFICE VISIT (OUTPATIENT)
Dept: OBSTETRICS AND GYNECOLOGY | Facility: CLINIC | Age: 70
End: 2025-08-07
Payer: MEDICARE

## 2025-08-07 VITALS
BODY MASS INDEX: 28.03 KG/M2 | DIASTOLIC BLOOD PRESSURE: 68 MMHG | WEIGHT: 164.2 LBS | SYSTOLIC BLOOD PRESSURE: 112 MMHG | HEIGHT: 64 IN

## 2025-08-07 DIAGNOSIS — Z13.820 SCREENING FOR OSTEOPOROSIS: ICD-10-CM

## 2025-08-07 DIAGNOSIS — N95.2 ATROPHIC VAGINITIS: ICD-10-CM

## 2025-08-07 DIAGNOSIS — M85.80 OSTEOPENIA, UNSPECIFIED LOCATION: ICD-10-CM

## 2025-08-07 DIAGNOSIS — Z12.31 ENCOUNTER FOR SCREENING MAMMOGRAM FOR MALIGNANT NEOPLASM OF BREAST: ICD-10-CM

## 2025-08-07 DIAGNOSIS — Z78.0 POSTMENOPAUSAL: Primary | ICD-10-CM

## 2025-08-07 DIAGNOSIS — Z01.419 WOMEN'S ANNUAL ROUTINE GYNECOLOGICAL EXAMINATION: Primary | ICD-10-CM

## 2025-08-07 RX ORDER — ESTRADIOL 0.1 MG/G
2 CREAM VAGINAL 3 TIMES WEEKLY
Qty: 42.5 G | Refills: 11 | Status: SHIPPED | OUTPATIENT
Start: 2025-08-08

## 2025-08-07 RX ORDER — HYDROCHLOROTHIAZIDE 25 MG/1
TABLET ORAL
COMMUNITY

## 2025-08-07 RX ORDER — MAGNESIUM GLUCONATE 27 MG(500)
100 TABLET ORAL DAILY
COMMUNITY

## 2025-08-08 LAB — REF LAB TEST METHOD: NORMAL

## (undated) DEVICE — SUT SILK 0/0 CT2 18IN C027D

## (undated) DEVICE — BLAKE CARDIO CONNECTOR 1:1: Brand: J-VAC

## (undated) DEVICE — SKIN PREP TRAY W/CHG: Brand: MEDLINE INDUSTRIES, INC.

## (undated) DEVICE — ELECTRD BLD EZ CLN MOD XLNG 2.75IN

## (undated) DEVICE — ADAPT/Y PERFUS DLP FML/LUER COLR/CODE/CLMP 8.9AND25.4CM

## (undated) DEVICE — TBG SXN RIGD MINI/SUCKER 9F 4.75IN

## (undated) DEVICE — SWAN-GANZ CCOMBO V THERMODILUTION CATHETER: Brand: SWAN-GANZ CCOMBO V

## (undated) DEVICE — PK ATS CUST W CARDIOTOMY RESEVOIR

## (undated) DEVICE — KT INTRO SHEATH PERC W/FULL DRP 9F

## (undated) DEVICE — ELECTRD BLD EZ CLN STD 2.5IN

## (undated) DEVICE — BLD MICROSHARP 15D 3MM BLU LF

## (undated) DEVICE — TTL1LYR 16FR10ML 100%SIL TMPST TR: Brand: MEDLINE

## (undated) DEVICE — LEVEL SENSORS PADS ARE USED TO ATTACH THE LEVEL SENSORS TO A HARD SHELL RESERVOIR. INCLUDES COUPLING GEL.: Brand: TERUMO® ADVANCED PERFUSION SYSTEM 1

## (undated) DEVICE — SUT PROLN 6/0 C1 D/A 30IN 8706H

## (undated) DEVICE — DRP SLUSH MACH

## (undated) DEVICE — Device

## (undated) DEVICE — SUT ETHIB 1 CTX CR8 18IN CX30D

## (undated) DEVICE — CVR HNDL LIGHT RIGID

## (undated) DEVICE — CATH INTRAVAS ULTRASND EAGLE EYE 2.9FR

## (undated) DEVICE — SPNG GZ WOVN 4X4IN 12PLY 10/BX STRL

## (undated) DEVICE — KT MANIFOLD CATHLAB CUST

## (undated) DEVICE — SUT SILK B CARDIO BB 5/0 30IN K880H BX/36

## (undated) DEVICE — DR ROGERS OH: Brand: MEDLINE INDUSTRIES, INC.

## (undated) DEVICE — TBG SXN INTRACARD RIDGID FLUT 24F .25X13IN A/

## (undated) DEVICE — SENSR CERBRL O2 PK/2

## (undated) DEVICE — SUT PROLN 7/0 BV1 D/A 24IN 8702H

## (undated) DEVICE — FLTR RESERV PERFUS INTERSEPT 02 STRL

## (undated) DEVICE — ANTIBACTERIAL UNDYED BRAIDED (POLYGLACTIN 910), SYNTHETIC ABSORBABLE SUTURE: Brand: COATED VICRYL

## (undated) DEVICE — CATH DIAG EXPO .056 FL3.5 6F 100CM

## (undated) DEVICE — PK CATH CARD 10

## (undated) DEVICE — EZ GLIDE AORTIC CANNULA: Brand: EDWARDS LIFESCIENCES EZ GLIDE AORTIC CANNULA

## (undated) DEVICE — CANN VESL DLP 1WY BLNT/TP 3MM

## (undated) DEVICE — AVID DUAL STAGE VENOUS DRAINAGE CANNULA: Brand: AVID DUAL STAGE VENOUS DRAINAGE CANNULA

## (undated) DEVICE — KT VLV HEMO MAP ACC PLS LG/BORE MTL/INTRO W/TORQ/DEV

## (undated) DEVICE — SUCTION CANISTER, 2500CC, RIGID: Brand: DEROYAL

## (undated) DEVICE — GUIDE CATHETER: Brand: MACH1™

## (undated) DEVICE — PENCL ROCKRSWCH MEGADYNE W/HOLSTR 10FT SS

## (undated) DEVICE — GLV SURG SENSICARE PI MIC PF SZ7.5 LF STRL

## (undated) DEVICE — BLAKE SILICONE DRAIN, 24 FR ROUND, HUBLESS: Brand: BLAKE

## (undated) DEVICE — CONNECTOR PH 6-IN-1 Y ST: Brand: CARDINAL HEALTH

## (undated) DEVICE — BOWL UTIL STRL 32OZ

## (undated) DEVICE — SUT SILK 4/0 TIES 18IN A183H

## (undated) DEVICE — 3M™ MEDIPORE™ H SOFT CLOTH SURGICAL TAPE, 2863, 3 IN X 10 YD, 12/CASE: Brand: 3M™ MEDIPORE™

## (undated) DEVICE — SUT ETHIB 2/0 SH SH 36IN X523H

## (undated) DEVICE — PAD ARMBRD SURG CONVOL 7.5X20X2IN

## (undated) DEVICE — GW LUGE .014 182 CM

## (undated) DEVICE — INTRO SHEATH PRELUDE IDEAL SPRNG COIL .021 6F 16X80CM

## (undated) DEVICE — CLTH CLENS READYCLEANSE PERI CARE PK/5

## (undated) DEVICE — SYR LUERLOK 30CC

## (undated) DEVICE — SYS VASOVIEW HEMOPRO ENDOSCOPIC HARVST VESL

## (undated) DEVICE — SUT PROLN 7/0 8747H

## (undated) DEVICE — PK PERFUS CUST W/CARDIOPLEGIA

## (undated) DEVICE — SUT SILK 2 SUTUPAK TIE 60IN SA8H 2STRAND

## (undated) DEVICE — CATH DIAG EXPO M/ PK 6FR FL4/FR4 PIG 3PK

## (undated) DEVICE — Device: Brand: OMNIWIRE PRESSURE GUIDE WIRE

## (undated) DEVICE — CANN AORT/ROOT DLP W/VNT/LN 14G 7F 14.6CM

## (undated) DEVICE — SUT SILK 2/0 CT1 CR8 18IN C022D

## (undated) DEVICE — DEV COMP RAD PRELUDESYNC 24CM

## (undated) DEVICE — SUT SILK 2/0 TIES 18IN A185H

## (undated) DEVICE — PK HEART OPN 10